# Patient Record
Sex: FEMALE | Race: WHITE | Employment: UNEMPLOYED | URBAN - METROPOLITAN AREA
[De-identification: names, ages, dates, MRNs, and addresses within clinical notes are randomized per-mention and may not be internally consistent; named-entity substitution may affect disease eponyms.]

---

## 2017-04-27 ENCOUNTER — APPOINTMENT (OUTPATIENT)
Dept: GENERAL RADIOLOGY | Age: 62
End: 2017-04-27
Attending: INTERNAL MEDICINE
Payer: MEDICAID

## 2017-04-27 ENCOUNTER — HOSPITAL ENCOUNTER (EMERGENCY)
Age: 62
Discharge: HOME | End: 2017-04-27
Attending: INTERNAL MEDICINE
Payer: MEDICAID

## 2017-04-27 VITALS
SYSTOLIC BLOOD PRESSURE: 119 MMHG | OXYGEN SATURATION: 97 % | HEIGHT: 66 IN | WEIGHT: 160 LBS | BODY MASS INDEX: 25.71 KG/M2 | DIASTOLIC BLOOD PRESSURE: 57 MMHG | RESPIRATION RATE: 16 BRPM | HEART RATE: 80 BPM | TEMPERATURE: 97.6 F

## 2017-04-27 DIAGNOSIS — R10.9 CHRONIC ABDOMINAL PAIN: ICD-10-CM

## 2017-04-27 DIAGNOSIS — J06.9 ACUTE UPPER RESPIRATORY INFECTION: ICD-10-CM

## 2017-04-27 DIAGNOSIS — R07.89 ATYPICAL CHEST PAIN: Primary | ICD-10-CM

## 2017-04-27 DIAGNOSIS — G89.29 CHRONIC ABDOMINAL PAIN: ICD-10-CM

## 2017-04-27 LAB
ALBUMIN SERPL BCP-MCNC: 3.8 G/DL (ref 3.4–5)
ALBUMIN/GLOB SERPL: 1 {RATIO} (ref 0.8–1.7)
ALP SERPL-CCNC: 73 U/L (ref 45–117)
ALT SERPL-CCNC: 19 U/L (ref 13–56)
ANION GAP BLD CALC-SCNC: 10 MMOL/L (ref 3–18)
AST SERPL W P-5'-P-CCNC: 20 U/L (ref 15–37)
BASOPHILS # BLD AUTO: 0 K/UL (ref 0–0.06)
BASOPHILS # BLD: 0 % (ref 0–2)
BILIRUB SERPL-MCNC: 0.2 MG/DL (ref 0.2–1)
BUN SERPL-MCNC: 18 MG/DL (ref 7–18)
BUN/CREAT SERPL: 16 (ref 12–20)
CALCIUM SERPL-MCNC: 9.1 MG/DL (ref 8.5–10.1)
CHLORIDE SERPL-SCNC: 101 MMOL/L (ref 100–108)
CK MB CFR SERPL CALC: NORMAL % (ref 0–4)
CK MB SERPL-MCNC: <1 NG/ML (ref 5–25)
CK SERPL-CCNC: 70 U/L (ref 26–192)
CO2 SERPL-SCNC: 25 MMOL/L (ref 21–32)
CREAT SERPL-MCNC: 1.11 MG/DL (ref 0.6–1.3)
DIFFERENTIAL METHOD BLD: ABNORMAL
EOSINOPHIL # BLD: 0.1 K/UL (ref 0–0.4)
EOSINOPHIL NFR BLD: 1 % (ref 0–5)
ERYTHROCYTE [DISTWIDTH] IN BLOOD BY AUTOMATED COUNT: 13.9 % (ref 11.6–14.5)
GLOBULIN SER CALC-MCNC: 3.7 G/DL (ref 2–4)
GLUCOSE SERPL-MCNC: 91 MG/DL (ref 74–99)
HCT VFR BLD AUTO: 35.2 % (ref 35–45)
HGB BLD-MCNC: 12.6 G/DL (ref 12–16)
LYMPHOCYTES # BLD AUTO: 27 % (ref 21–52)
LYMPHOCYTES # BLD: 3 K/UL (ref 0.9–3.6)
MCH RBC QN AUTO: 32.6 PG (ref 24–34)
MCHC RBC AUTO-ENTMCNC: 35.8 G/DL (ref 31–37)
MCV RBC AUTO: 91 FL (ref 74–97)
MONOCYTES # BLD: 0.8 K/UL (ref 0.05–1.2)
MONOCYTES NFR BLD AUTO: 8 % (ref 3–10)
NEUTS SEG # BLD: 6.9 K/UL (ref 1.8–8)
NEUTS SEG NFR BLD AUTO: 64 % (ref 40–73)
PLATELET # BLD AUTO: 307 K/UL (ref 135–420)
PMV BLD AUTO: 8.9 FL (ref 9.2–11.8)
POTASSIUM SERPL-SCNC: 4.6 MMOL/L (ref 3.5–5.5)
PROT SERPL-MCNC: 7.5 G/DL (ref 6.4–8.2)
RBC # BLD AUTO: 3.87 M/UL (ref 4.2–5.3)
SODIUM SERPL-SCNC: 136 MMOL/L (ref 136–145)
TROPONIN I SERPL-MCNC: <0.02 NG/ML (ref 0–0.06)
WBC # BLD AUTO: 10.9 K/UL (ref 4.6–13.2)

## 2017-04-27 PROCEDURE — 80053 COMPREHEN METABOLIC PANEL: CPT | Performed by: INTERNAL MEDICINE

## 2017-04-27 PROCEDURE — 93005 ELECTROCARDIOGRAM TRACING: CPT

## 2017-04-27 PROCEDURE — 85025 COMPLETE CBC W/AUTO DIFF WBC: CPT | Performed by: INTERNAL MEDICINE

## 2017-04-27 PROCEDURE — 71010 XR CHEST PORT: CPT

## 2017-04-27 PROCEDURE — 82550 ASSAY OF CK (CPK): CPT | Performed by: INTERNAL MEDICINE

## 2017-04-27 PROCEDURE — 74011250637 HC RX REV CODE- 250/637: Performed by: INTERNAL MEDICINE

## 2017-04-27 PROCEDURE — 99284 EMERGENCY DEPT VISIT MOD MDM: CPT

## 2017-04-27 RX ORDER — ACETAMINOPHEN 500 MG
1000 TABLET ORAL
Status: COMPLETED | OUTPATIENT
Start: 2017-04-27 | End: 2017-04-27

## 2017-04-27 RX ORDER — AZITHROMYCIN 250 MG/1
TABLET, FILM COATED ORAL
Qty: 15 TAB | Refills: 0 | Status: SHIPPED | OUTPATIENT
Start: 2017-04-27 | End: 2017-05-02

## 2017-04-27 RX ORDER — BACLOFEN 10 MG/1
10 TABLET ORAL
Qty: 15 TAB | Refills: 0 | Status: SHIPPED | OUTPATIENT
Start: 2017-04-27 | End: 2017-09-25

## 2017-04-27 RX ADMIN — ACETAMINOPHEN 1000 MG: 500 TABLET ORAL at 13:16

## 2017-04-27 NOTE — ED PROVIDER NOTES
[address]  EMERGENCY DEPARTMENT HISTORY AND PHYSICAL EXAM       Date: 4/27/2017   Patient Name: Narinder Ott   YOB: 1955  Medical Record Number: [id number]    History of Presenting Illness     Chief Complaint   Patient presents with    Chest Pain    Shortness of Breath    Back Pain        History Provided By:  Patient     Additional History:   11:19 AM   Narinder Ott is a 63 y.o. female presenting to the ED c/o CP and SOB x 3 days. Associated with fever (102F, yesterday), chills, neck stiffness, back pain, generalized weakness, right ear pain, cough, congestion, constipation, difficulty urinating, frequency, and decreased urine. Last BM this AM dark brown in color. Admits to sick contacts at home. PMHx include COPD, asthma, osteoporosis, and dystonia. Admits to smoking 0.5 ppd cigarettes daily. Denies vomiting, nvd, new abd pain, and any other sxs or complaints. Primary Care Provider: Chetan Stack MD   Specialist:    Past History     Past Medical History:   Past Medical History:   Diagnosis Date    Asbestosis ([address]     Asthma     Back pain     Cancer ([address]     growth in stomach    [facility]     Dystonia     MI (myocardial infarction) [facility] [facility]     Osteoporosis     Psychiatric disorder     \"nerves\"         Past Surgical History:   Past Surgical History:   Procedure Laterality Date    HX GYN      C section, hysterectomy        Social History:   Social History   Substance Use Topics    Smoking status: Current Every Day Smoker     Packs/day: 0.50    Smokeless tobacco: None    Alcohol use Yes      Comment: rare        Allergies:    Allergies   Allergen Reactions    Aspirin Other (comments)     Spasms      Ceclor [Cefaclor] Rash     Constipated headache    Ciprofloxacin Rash and Nausea Only    Cogentin [Benztropine] Other (comments)     \"Spasms\"    Ibuprofen Other (comments)     Spasms      Sulfa (Sulfonamide Antibiotics) Rash     Constipation, headache    Thorazine [Chlorpromazine] Other (comments)     \" I hear voices\"         Review of Systems   Review of Systems   Constitutional: Positive for chills and fever. HENT: Positive for congestion and ear pain. Negative for sore throat. Respiratory: Positive for cough and shortness of breath. Cardiovascular: Positive for chest pain. Gastrointestinal: Positive for constipation. Negative for abdominal distention, nausea and vomiting. Genitourinary: Positive for decreased urine volume, difficulty urinating and frequency. Negative for dysuria, flank pain, hematuria, urgency, vaginal bleeding and vaginal discharge. Musculoskeletal: Positive for back pain and neck stiffness. Negative for arthralgias and joint swelling. Skin: Negative for rash and wound. Neurological: Positive for weakness. Negative for dizziness, light-headedness and headaches. Hematological: Negative for adenopathy. All other systems reviewed and are negative. Physical Exam  Vitals:    04/27/17 1051 04/27/17 1246   BP: 143/80 119/57   Pulse: 83 80   Resp: 16 16   Temp: 97.6 °F (36.4 °C)    SpO2: 97%    Weight: 72.6 kg (160 lb)    Height: 5' 6\" (1.676 m)        Physical Exam   Constitutional: She is oriented to person, place, and time. She appears well-developed and well-nourished. HENT:   Head: Normocephalic and atraumatic. Right Ear: External ear normal. Tympanic membrane is erythematous. A middle ear effusion is present. Left Ear: External ear normal.   Nose: Mucosal edema present. Right sinus exhibits frontal sinus tenderness. Left sinus exhibits frontal sinus tenderness. Mouth/Throat: Oropharynx is clear and moist.   Nasal mild erythema. Eyes: Conjunctivae and EOM are normal. Pupils are equal, round, and reactive to light. Right eye exhibits no discharge. Left eye exhibits no discharge. No scleral icterus. Neck: Normal range of motion. Neck supple. No JVD present.  No tracheal deviation present. Cardiovascular: Normal rate, regular rhythm, normal heart sounds and intact distal pulses. Pulmonary/Chest: Effort normal and breath sounds normal. No respiratory distress. Abdominal: Soft. Bowel sounds are normal. She exhibits no distension. There is no tenderness. No HSM   Musculoskeletal: Normal range of motion. She exhibits no edema or tenderness. Neurological: She is alert and oriented to person, place, and time. She has normal reflexes. No focal motor weakness. Skin: Skin is warm and dry. No rash noted. She is not diaphoretic. Psychiatric: She has a normal mood and affect. Her behavior is normal.   Nursing note and vitals reviewed. Diagnostic Study Results     Labs -      Recent Results (from the past 12 hour(s))   EKG, 12 LEAD, INITIAL    Collection Time: 04/27/17 10:56 AM   Result Value Ref Range    Ventricular Rate 80 BPM    Atrial Rate 80 BPM    P-R Interval 154 ms    QRS Duration 84 ms    Q-T Interval 392 ms    QTC Calculation (Bezet) 452 ms    Calculated P Axis 28 degrees    Calculated R Axis -57 degrees    Calculated T Axis 42 degrees    Diagnosis       Normal sinus rhythm  Left axis deviation  Abnormal ECG  When compared with ECG of 02-NOV-2016 14:54,  RSR' pattern in V1 is no longer present  Criteria for Anteroseptal infarct are no longer present     CBC WITH AUTOMATED DIFF    Collection Time: 04/27/17 11:00 AM   Result Value Ref Range    WBC 10.9 4.6 - 13.2 K/uL    RBC 3.87 (L) 4.20 - 5.30 M/uL    HGB 12.6 12.0 - 16.0 g/dL    HCT 35.2 35.0 - 45.0 %    MCV 91.0 74.0 - 97.0 FL    MCH 32.6 24.0 - 34.0 PG    MCHC 35.8 31.0 - 37.0 g/dL    RDW 13.9 11.6 - 14.5 %    PLATELET 127 951 - 298 K/uL    MPV 8.9 (L) 9.2 - 11.8 FL    NEUTROPHILS 64 40 - 73 %    LYMPHOCYTES 27 21 - 52 %    MONOCYTES 8 3 - 10 %    EOSINOPHILS 1 0 - 5 %    BASOPHILS 0 0 - 2 %    ABS. NEUTROPHILS 6.9 1.8 - 8.0 K/UL    ABS. LYMPHOCYTES 3.0 0.9 - 3.6 K/UL    ABS.  MONOCYTES 0.8 0.05 - 1.2 K/UL    ABS. EOSINOPHILS 0.1 0.0 - 0.4 K/UL    ABS. BASOPHILS 0.0 0.0 - 0.06 K/UL    DF AUTOMATED     METABOLIC PANEL, COMPREHENSIVE    Collection Time: 04/27/17 11:00 AM   Result Value Ref Range    Sodium 136 136 - 145 mmol/L    Potassium 4.6 3.5 - 5.5 mmol/L    Chloride 101 100 - 108 mmol/L    CO2 25 21 - 32 mmol/L    Anion gap 10 3.0 - 18 mmol/L    Glucose 91 74 - 99 mg/dL    BUN 18 7.0 - 18 MG/DL    Creatinine 1.11 0.6 - 1.3 MG/DL    BUN/Creatinine ratio 16 12 - 20      GFR est AA >60 >60 ml/min/1.73m2    GFR est non-AA 50 (L) >60 ml/min/1.73m2    Calcium 9.1 8.5 - 10.1 MG/DL    Bilirubin, total 0.2 0.2 - 1.0 MG/DL    ALT (SGPT) 19 13 - 56 U/L    AST (SGOT) 20 15 - 37 U/L    Alk. phosphatase 73 45 - 117 U/L    Protein, total 7.5 6.4 - 8.2 g/dL    Albumin 3.8 3.4 - 5.0 g/dL    Globulin 3.7 2.0 - 4.0 g/dL    A-G Ratio 1.0 0.8 - 1.7     CARDIAC PANEL,(CK, CKMB & TROPONIN)    Collection Time: 04/27/17 11:00 AM   Result Value Ref Range    CK 70 26 - 192 U/L    CK - MB <1.0 <3.6 ng/ml    CK-MB Index Cannot be calulated 0.0 - 4.0 %    Troponin-I, Qt. <0.02 0.00 - 0.06 NG/ML       Radiologic Studies -    XR CHEST PORT   Final Result   IMPRESSION:     Hypoinflated lungs without acute cardiopulmonary disease. Stable exam.    As read by the radiologist.          Medical Decision Making   I am the first provider for this patient. I reviewed the vital signs, available nursing notes, past medical history, past surgical history, family history and social history. Vital Signs-Reviewed the patient's vital signs. Patient Vitals for the past 12 hrs:   Temp Pulse Resp BP SpO2   04/27/17 1246 - 80 16 119/57 -   04/27/17 1051 97.6 °F (36.4 °C) 83 16 143/80 97 %       Pulse Oximetry Analysis - Normal 97% on room air     Cardiac Monitor:   Rate: 80 bpm  Rhythm: Normal Sinus Rhythm     EKG interpretation: (Preliminary)  Rate 80 bpm NSR. Left axis deviation. No STEMI.   EKG read by Cleve Espinoza MD at 10:56 AM    Old Medical Records: Nursing notes. Provider Notes:     Ddx: ACS, CHF, MI, PE, pneumonia, pneumothorax, esophageal spasms, GERD, anemia, uremia. Rule out: other cardiovascular, pulmonary, or GI pathology. ED Course:      11:19 AM  Initial assessment performed. 12:24 PM   Pt resting comfortably, feeling better, ready to go home. Pt requests pain medication, due to allergies informed I will give Tylenol. DISCHARGE NOTE:   12:31 PM   Pt has been reexamined. Patient has no new complaints, changes, or physical findings. Care plan outlined and precautions discussed. Results were reviewed with the patient. All medications were reviewed with the patient; will d/c home. All of pt's questions and concerns were addressed. Patient was instructed and agrees to follow up with PCP, as well as to return to the ED upon further deterioration. Patient is ready to go home. Medications   acetaminophen (TYLENOL) tablet 1,000 mg (not administered)         Diagnosis   Clinical Impression:   1. Atypical chest pain    2. Acute upper respiratory infection    3. Chronic abdominal pain         Follow-up Information     Follow up With Details Comments Contact Info    Trista Rao MD Schedule an appointment as soon as possible for a visit in 1 day for PCP follow up [address]  [address] [facility]      THE [facility] EMERGENCY DEPT Go to As needed, If symptoms worsen [address] [place] [id number]  [phone]          Current Discharge Medication List      START taking these medications    Details   azithromycin (ZITHROMAX Z-MADHAVI) 250 mg tablet Take 2 today then 1 tablet daily x 4 days  Qty: 15 Tab, Refills: 0      baclofen (LIORESAL) 10 mg tablet Take 1 Tab by mouth two (2) times daily as needed.   Qty: 15 Tab, Refills: 0           _______________________________   Attestations:     SCRIBE ATTESTATION STATEMENT  Documented by: Eduar Lemons, scribing for and in the presence of Raeann Delatorre, MD.    PROVIDER ATTESTATION STATEMENT  I personally performed the services described in the documentation, reviewed the documentation, as recorded by the scribe in my presence, and it accurately and completely records my words and actions.   Eura Hatchet, MD.      _______________________________

## 2017-04-27 NOTE — ED TRIAGE NOTES
Pt ambulated into er today with complaints of chest pain and shortness of breath that started this morning. Pt also with complaints of back pain, neck pain, and leg cramps. Sepsis Screening completed    (  )Patient meets SIRS criteria. (x  )Patient does not meet SIRS criteria.       SIRS Criteria is achieved when two or more of the following are present   Temperature < 96.8°F (36°C) or > 100.9°F (38.3°C)   Heart Rate > 90 beats per minute   Respiratory Rate > 20 breaths per minute   WBC count > 12,000 or <4,000 or > 10% bands

## 2017-04-27 NOTE — DISCHARGE INSTRUCTIONS
Chest Pain: Care Instructions  Your Care Instructions  There are many things that can cause chest pain. Some are not serious and will get better on their own in a few days. But some kinds of chest pain need more testing and treatment. Your doctor may have recommended a follow-up visit in the next 8 to 12 hours. If you are not getting better, you may need more tests or treatment. Even though your doctor has released you, you still need to watch for any problems. The doctor carefully checked you, but sometimes problems can develop later. If you have new symptoms or if your symptoms do not get better, get medical care right away. If you have worse or different chest pain or pressure that lasts more than 5 minutes or you passed out (lost consciousness), call 911 or seek other emergency help right away. A medical visit is only one step in your treatment. Even if you feel better, you still need to do what your doctor recommends, such as going to all suggested follow-up appointments and taking medicines exactly as directed. This will help you recover and help prevent future problems. How can you care for yourself at home? · Rest until you feel better. · Take your medicine exactly as prescribed. Call your doctor if you think you are having a problem with your medicine. · Do not drive after taking a prescription pain medicine. When should you call for help? Call 911 if:  · You passed out (lost consciousness). · You have severe difficulty breathing. · You have symptoms of a heart attack. These may include:  ¨ Chest pain or pressure, or a strange feeling in your chest.  ¨ Sweating. ¨ Shortness of breath. ¨ Nausea or vomiting. ¨ Pain, pressure, or a strange feeling in your back, neck, jaw, or upper belly or in one or both shoulders or arms. ¨ Lightheadedness or sudden weakness. ¨ A fast or irregular heartbeat.   After you call 911, the  may tell you to chew 1 adult-strength or 2 to 4 low-dose aspirin. Wait for an ambulance. Do not try to drive yourself. Call your doctor today if:  · You have any trouble breathing. · Your chest pain gets worse. · You are dizzy or lightheaded, or you feel like you may faint. · You are not getting better as expected. · You are having new or different chest pain. Where can you learn more? Go to http://jenn-zachariah.info/. Enter A120 in the search box to learn more about \"Chest Pain: Care Instructions. \"  Current as of: May 27, 2016  Content Version: 11.2  © 1792-5718 [employer]. Care instructions adapted under license by [employer] (which disclaims liability or warranty for this information). If you have questions about a medical condition or this instruction, always ask your healthcare professional. [address] any warranty or liability for your use of this information. Upper Respiratory Infection (Cold): Care Instructions  Your Care Instructions    An upper respiratory infection, or URI, is an infection of the nose, sinuses, or throat. URIs are spread by coughs, sneezes, and direct contact. The common cold is the most frequent kind of URI. The flu and sinus infections are other kinds of URIs. Almost all URIs are caused by viruses. Antibiotics won't cure them. But you can treat most infections with home care. This may include drinking lots of fluids and taking over-the-counter pain medicine. You will probably feel better in 4 to 10 days. The doctor has checked you carefully, but problems can develop later. If you notice any problems or new symptoms, get medical treatment right away. Follow-up care is a key part of your treatment and safety. Be sure to make and go to all appointments, and call your doctor if you are having problems. It's also a good idea to know your test results and keep a list of the medicines you take. How can you care for yourself at home?   · To prevent dehydration, drink plenty of fluids, enough so that your urine is light yellow or clear like water. Choose water and other caffeine-free clear liquids until you feel better. If you have kidney, heart, or liver disease and have to limit fluids, talk with your doctor before you increase the amount of fluids you drink. · Take an over-the-counter pain medicine, such as acetaminophen (Tylenol), ibuprofen (Advil, Motrin), or naproxen (Aleve). Read and follow all instructions on the label. · Before you use cough and cold medicines, check the label. These medicines may not be safe for young children or for people with certain health problems. · Be careful when taking over-the-counter cold or flu medicines and Tylenol at the same time. Many of these medicines have acetaminophen, which is Tylenol. Read the labels to make sure that you are not taking more than the recommended dose. Too much acetaminophen (Tylenol) can be harmful. · Get plenty of rest.  · Do not smoke or allow others to smoke around you. If you need help quitting, talk to your doctor about stop-smoking programs and medicines. These can increase your chances of quitting for good. When should you call for help? Call 911 anytime you think you may need emergency care. For example, call if:  · You have severe trouble breathing. Call your doctor now or seek immediate medical care if:  · You seem to be getting much sicker. · You have new or worse trouble breathing. · You have a new or higher fever. · You have a new rash. Watch closely for changes in your health, and be sure to contact your doctor if:  · You have a new symptom, such as a sore throat, an earache, or sinus pain. · You cough more deeply or more often, especially if you notice more mucus or a change in the color of your mucus. · You do not get better as expected. Where can you learn more? Go to http://jenn-zachariah.info/.   Enter X344 in the search box to learn more about \"Upper Respiratory Infection (Cold): Care Instructions. \"  Current as of: June 30, 2016  Content Version: 11.2  © 1585-2776 [employer]. Care instructions adapted under license by [employer] (which disclaims liability or warranty for this information). If you have questions about a medical condition or this instruction, always ask your healthcare professional. [address] any warranty or liability for your use of this information. Abdominal Pain: Care Instructions  Your Care Instructions    Abdominal pain has many possible causes. Some aren't serious and get better on their own in a few days. Others need more testing and treatment. If your pain continues or gets worse, you need to be rechecked and may need more tests to find out what is wrong. You may need surgery to correct the problem. Don't ignore new symptoms, such as fever, nausea and vomiting, urination problems, pain that gets worse, and dizziness. These may be signs of a more serious problem. Your doctor may have recommended a follow-up visit in the next 8 to 12 hours. If you are not getting better, you may need more tests or treatment. The doctor has checked you carefully, but problems can develop later. If you notice any problems or new symptoms, get medical treatment right away. Follow-up care is a key part of your treatment and safety. Be sure to make and go to all appointments, and call your doctor if you are having problems. It's also a good idea to know your test results and keep a list of the medicines you take. How can you care for yourself at home? · Rest until you feel better. · To prevent dehydration, drink plenty of fluids, enough so that your urine is light yellow or clear like water. Choose water and other caffeine-free clear liquids until you feel better.  If you have kidney, heart, or liver disease and have to limit fluids, talk with your doctor before you increase the amount of fluids you drink.  · If your stomach is upset, eat mild foods, such as rice, dry toast or crackers, bananas, and applesauce. Try eating several small meals instead of two or three large ones. · Wait until 48 hours after all symptoms have gone away before you have spicy foods, alcohol, and drinks that contain caffeine. · Do not eat foods that are high in fat. · Avoid anti-inflammatory medicines such as aspirin, ibuprofen (Advil, Motrin), and naproxen (Aleve). These can cause stomach upset. Talk to your doctor if you take daily aspirin for another health problem. When should you call for help? Call 911 anytime you think you may need emergency care. For example, call if:  · You passed out (lost consciousness). · You pass maroon or very bloody stools. · You vomit blood or what looks like coffee grounds. · You have new, severe belly pain. Call your doctor now or seek immediate medical care if:  · Your pain gets worse, especially if it becomes focused in one area of your belly. · You have a new or higher fever. · Your stools are black and look like tar, or they have streaks of blood. · You have unexpected vaginal bleeding. · You have symptoms of a urinary tract infection. These may include:  ¨ Pain when you urinate. ¨ Urinating more often than usual.  ¨ Blood in your urine. · You are dizzy or lightheaded, or you feel like you may faint. Watch closely for changes in your health, and be sure to contact your doctor if:  · You are not getting better after 1 day (24 hours). Where can you learn more? Go to http://jenn-zachariah.info/. Enter P840 in the search box to learn more about \"Abdominal Pain: Care Instructions. \"  Current as of: May 27, 2016  Content Version: 11.2  © 1652-9940 [employer]. Care instructions adapted under license by [employer] (which disclaims liability or warranty for this information).  If you have questions about a medical condition or this instruction, always ask your healthcare professional. [place] [address] any warranty or liability for your use of this information.

## 2017-04-28 LAB
ATRIAL RATE: 80 BPM
CALCULATED P AXIS, ECG09: 28 DEGREES
CALCULATED R AXIS, ECG10: -57 DEGREES
CALCULATED T AXIS, ECG11: 42 DEGREES
DIAGNOSIS, 93000: NORMAL
P-R INTERVAL, ECG05: 154 MS
Q-T INTERVAL, ECG07: 392 MS
QRS DURATION, ECG06: 84 MS
QTC CALCULATION (BEZET), ECG08: 452 MS
VENTRICULAR RATE, ECG03: 80 BPM

## 2017-08-07 ENCOUNTER — HOSPITAL ENCOUNTER (EMERGENCY)
Age: 62
Discharge: HOME | End: 2017-08-07
Attending: EMERGENCY MEDICINE
Payer: MEDICAID

## 2017-08-07 VITALS
OXYGEN SATURATION: 95 % | BODY MASS INDEX: 24.11 KG/M2 | HEIGHT: 66 IN | WEIGHT: 150 LBS | SYSTOLIC BLOOD PRESSURE: 123 MMHG | DIASTOLIC BLOOD PRESSURE: 75 MMHG | TEMPERATURE: 98.8 F | RESPIRATION RATE: 16 BRPM | HEART RATE: 71 BPM

## 2017-08-07 DIAGNOSIS — M54.2 CHRONIC NECK AND BACK PAIN: Primary | ICD-10-CM

## 2017-08-07 DIAGNOSIS — M54.9 CHRONIC NECK AND BACK PAIN: Primary | ICD-10-CM

## 2017-08-07 DIAGNOSIS — G89.29 CHRONIC NECK AND BACK PAIN: Primary | ICD-10-CM

## 2017-08-07 LAB
APPEARANCE UR: CLEAR
BILIRUB UR QL: NEGATIVE
COLOR UR: YELLOW
GLUCOSE UR STRIP.AUTO-MCNC: NEGATIVE MG/DL
HGB UR QL STRIP: NEGATIVE
KETONES UR QL STRIP.AUTO: NEGATIVE MG/DL
LEUKOCYTE ESTERASE UR QL STRIP.AUTO: NEGATIVE
NITRITE UR QL STRIP.AUTO: NEGATIVE
PH UR STRIP: 5 [PH] (ref 5–8)
PROT UR STRIP-MCNC: NEGATIVE MG/DL
SP GR UR REFRACTOMETRY: 1.01 (ref 1–1.03)
UROBILINOGEN UR QL STRIP.AUTO: 0.2 EU/DL (ref 0.2–1)

## 2017-08-07 PROCEDURE — 81003 URINALYSIS AUTO W/O SCOPE: CPT | Performed by: EMERGENCY MEDICINE

## 2017-08-07 PROCEDURE — 96372 THER/PROPH/DIAG INJ SC/IM: CPT

## 2017-08-07 PROCEDURE — 99283 EMERGENCY DEPT VISIT LOW MDM: CPT

## 2017-08-07 PROCEDURE — 74011250636 HC RX REV CODE- 250/636: Performed by: EMERGENCY MEDICINE

## 2017-08-07 RX ORDER — CYCLOBENZAPRINE HCL 10 MG
10 TABLET ORAL
Qty: 20 TAB | Refills: 0 | Status: SHIPPED | OUTPATIENT
Start: 2017-08-07 | End: 2017-09-25

## 2017-08-07 RX ORDER — KETOROLAC TROMETHAMINE 10 MG/1
10 TABLET, FILM COATED ORAL EVERY 8 HOURS
Qty: 15 TAB | Refills: 0 | Status: SHIPPED | OUTPATIENT
Start: 2017-08-07 | End: 2017-08-12

## 2017-08-07 RX ORDER — KETOROLAC TROMETHAMINE 30 MG/ML
60 INJECTION, SOLUTION INTRAMUSCULAR; INTRAVENOUS
Status: COMPLETED | OUTPATIENT
Start: 2017-08-07 | End: 2017-08-07

## 2017-08-07 RX ADMIN — KETOROLAC TROMETHAMINE 60 MG: 30 INJECTION, SOLUTION INTRAMUSCULAR at 11:57

## 2017-08-07 NOTE — ED PROVIDER NOTES
HPI Comments: 11:39 AM  Dimas López is a 63 y.o. female with a PMHx of MI who presents to the ED c/o 8/10 acute on chronic neck and bilateral lower back pain onset yesterday. Pt was previously evaluated by her PCP and rxed Codeine for osteoporosis, but hasn't been taking for the past 3 months because she couldn't get an appointement. Not followed by pain management. PMHx of asbestos exposure. Pt endorses tobacco use and rare EtOH use. Patient denies fever, chills, new SOB, any other sxs or complaints. Patient is a 63 y.o. female presenting with back pain. The history is provided by the patient. No  was used. Back Pain    This is a chronic (acute on) problem. The current episode started yesterday. The problem has not changed since onset. The pain is associated with no known injury. The pain is at a severity of 8/10. Pertinent negatives include no fever. Risk factors include a history of osteoporosis. Past Medical History:   Diagnosis Date    Asbestosis ([address]     Asthma     Back pain     Cancer ([address]     growth in stomach    Chronic obstructive pulmonary disease (HCC)     Chronic pain     Dystonia     MI (myocardial infarction) ([address]     Osteoporosis     Psychiatric disorder     \"nerves\"        Past Surgical History:   Procedure Laterality Date    HX GYN      C section, hysterectomy         No family history on file. Social History     Social History    Marital status: SINGLE     Spouse name: N/A    Number of children: N/A    Years of education: N/A     Occupational History    Not on file. Social History Main Topics    Smoking status: Current Every Day Smoker     Packs/day: 0.50    Smokeless tobacco: Not on file    Alcohol use Yes      Comment: rare    Drug use: No    Sexual activity: Not on file     Other Topics Concern    Not on file     Social History Narrative         ALLERGIES: Aspirin; Ceclor [cefaclor];  Ciprofloxacin; Cogentin [benztropine]; Ibuprofen; Sulfa (sulfonamide antibiotics); and Thorazine [chlorpromazine]    Review of Systems   Constitutional: Negative for chills and fever. Respiratory: Negative for shortness of breath (new). Musculoskeletal: Positive for back pain and neck pain. All other systems reviewed and are negative. Vitals:    08/07/17 1131 08/07/17 1155   BP: (!) 138/107 123/75   Pulse: (!) 106 71   Resp: 16    Temp: 98.8 °F (37.1 °C)    SpO2: 95%    Weight: 68 kg (150 lb)    Height: 5' 6\" (1.676 m)             Physical Exam   Constitutional: She is oriented to person, place, and time. She appears ill (chronically). HENT:   Head: Normocephalic and atraumatic. Unusual facial gestures. Eyes: Pupils are equal, round, and reactive to light. Neck: Neck supple. Cardiovascular: Normal rate, regular rhythm, S1 normal, S2 normal and normal heart sounds. Pulmonary/Chest: Breath sounds normal. No respiratory distress. She has no wheezes. She has no rales. She exhibits no tenderness. Abdominal: Soft. She exhibits no distension and no mass. There is no tenderness. There is no guarding. Musculoskeletal: Normal range of motion. She exhibits tenderness. She exhibits no edema. Cervical back: She exhibits tenderness (diffuse). Lumbar back: She exhibits tenderness (midline). Neurological: She is alert and oriented to person, place, and time. No cranial nerve deficit. Skin: No rash noted. Psychiatric: She has a normal mood and affect. Her behavior is normal. Thought content normal.   Nursing note and vitals reviewed.        RESULTS:    PULSE OXIMETRY NOTE:  Pulse-ox is 95% on RA  Interpretation: Normal       No orders to display        Labs Reviewed   URINALYSIS W/ RFLX MICROSCOPIC       Recent Results (from the past 12 hour(s))   URINALYSIS W/ RFLX MICROSCOPIC    Collection Time: 08/07/17  1:00 PM   Result Value Ref Range    Color YELLOW      Appearance CLEAR      Specific gravity 1.006 1.005 - 1.030 pH (UA) 5.0 5.0 - 8.0      Protein NEGATIVE  NEG mg/dL    Glucose NEGATIVE  NEG mg/dL    Ketone NEGATIVE  NEG mg/dL    Bilirubin NEGATIVE  NEG      Blood NEGATIVE  NEG      Urobilinogen 0.2 0.2 - 1.0 EU/dL    Nitrites NEGATIVE  NEG      Leukocyte Esterase NEGATIVE  NEG         MDM  Number of Diagnoses or Management Options  Chronic neck and back pain:      Amount and/or Complexity of Data Reviewed  Clinical lab tests: ordered and reviewed      ED Course     MEDICATIONS GIVEN:  Medications   ketorolac tromethamine (TORADOL) 60 mg/2 mL injection 60 mg (60 mg IntraMUSCular Given 8/7/17 1157)         Procedures    PROGRESS NOTE:   11:39 AM  Initial assesment performed. Written by Gabrielle Mccullough ED Scribe, as dictated by Francis Coleman MD.     DISCHARGE NOTE:  1:31 PM  Corey Presume  results have been reviewed with her. She has been counseled regarding her diagnosis, treatment, and plan. She verbally conveys understanding and agreement of the signs, symptoms, diagnosis, treatment and prognosis and additionally agrees to follow up as discussed. She also agrees with the care-plan and conveys that all of her questions have been answered. I have also provided discharge instructions for her that include: educational information regarding their diagnosis and treatment, and list of reasons why they would want to return to the ED prior to their follow-up appointment, should her condition change. CLINICAL IMPRESSION:    1. Chronic neck and back pain        PLAN:  1. D/C Home  2. Discharge Medication List as of 8/7/2017  1:23 PM      START taking these medications    Details   ketorolac (TORADOL) 10 mg tablet Take 1 Tab by mouth every eight (8) hours for 5 days. , Normal, Disp-15 Tab, R-0      cyclobenzaprine (FLEXERIL) 10 mg tablet Take 1 Tab by mouth three (3) times daily as needed for Muscle Spasm(s). , Normal, Disp-20 Tab, R-0         CONTINUE these medications which have NOT CHANGED    Details   baclofen (LIORESAL) 10 mg tablet Take 1 Tab by mouth two (2) times daily as needed., Normal, Disp-15 Tab, R-0      albuterol (PROVENTIL HFA, VENTOLIN HFA, PROAIR HFA) 90 mcg/actuation inhaler Take 2 Puffs by inhalation every four (4) hours as needed for Wheezing., Print, Disp-1 Inhaler, R-0      fluPHENZAine decanoate (PROLIXIN) 25 mg/mL injection by IntraMUSCular route every fourteen (14) days. , Historical Med           3. Follow-up Information     Follow up With Details Comments Contact Info    Chhaya Quinonez MD Schedule an appointment as soon as possible for a visit in 2 days For PCP follow up [phone] [facility] [address]      [facility] EMERGENCY DEPT Go to As needed, If symptoms worsen [address] [place] [id number]  [phone]            ATTESTATION:  This note is prepared by Etta Aldrich and Kenny Gomez, acting as a Scribe for Sada Ramirez MD on 11:39 AM on 8/7/2017 . Sada Ramirez MD: The scribe's documentation has been prepared under my direction and personally reviewed by me in its entirety.

## 2017-08-07 NOTE — ED TRIAGE NOTES
C/o bilateral flank pain, low abd pain, rib pain, bilateral shoulder pain and states she feels weak all over. States she hasn't been in pain management for 3 months. Sepsis Screening completed    (  )Patient meets SIRS criteria. (x  )Patient does not meet SIRS criteria.       SIRS Criteria is achieved when two or more of the following are present   Temperature < 96.8°F (36°C) or > 100.9°F (38.3°C)   Heart Rate > 90 beats per minute   Respiratory Rate > 20 breaths per minute   WBC count > 12,000 or <4,000 or > 10% bands

## 2017-08-07 NOTE — DISCHARGE INSTRUCTIONS
Back Pain: Care Instructions  Your Care Instructions    Back pain has many possible causes. It is often related to problems with muscles and ligaments of the back. It may also be related to problems with the nerves, discs, or bones of the back. Moving, lifting, standing, sitting, or sleeping in an awkward way can strain the back. Sometimes you don't notice the injury until later. Arthritis is another common cause of back pain. Although it may hurt a lot, back pain usually improves on its own within several weeks. Most people recover in 12 weeks or less. Using good home treatment and being careful not to stress your back can help you feel better sooner. Follow-up care is a key part of your treatment and safety. Be sure to make and go to all appointments, and call your doctor if you are having problems. Its also a good idea to know your test results and keep a list of the medicines you take. How can you care for yourself at home? · Sit or lie in positions that are most comfortable and reduce your pain. Try one of these positions when you lie down:  ¨ Lie on your back with your knees bent and supported by large pillows. ¨ Lie on the floor with your legs on the seat of a sofa or chair. Aldo Ebbing on your side with your knees and hips bent and a pillow between your legs. ¨ Lie on your stomach if it does not make pain worse. · Do not sit up in bed, and avoid soft couches and twisted positions. Bed rest can help relieve pain at first, but it delays healing. Avoid bed rest after the first day of back pain. · Change positions every 30 minutes. If you must sit for long periods of time, take breaks from sitting. Get up and walk around, or lie in a comfortable position. · Try using a heating pad on a low or medium setting for 15 to 20 minutes every 2 or 3 hours. Try a warm shower in place of one session with the heating pad. · You can also try an ice pack for 10 to 15 minutes every 2 to 3 hours.  Put a thin cloth between the ice pack and your skin. · Take pain medicines exactly as directed. ¨ If the doctor gave you a prescription medicine for pain, take it as prescribed. ¨ If you are not taking a prescription pain medicine, ask your doctor if you can take an over-the-counter medicine. · Take short walks several times a day. You can start with 5 to 10 minutes, 3 or 4 times a day, and work up to longer walks. Walk on level surfaces and avoid hills and stairs until your back is better. · Return to work and other activities as soon as you can. Continued rest without activity is usually not good for your back. · To prevent future back pain, do exercises to stretch and strengthen your back and stomach. Learn how to use good posture, safe lifting techniques, and proper body mechanics. When should you call for help? Call your doctor now or seek immediate medical care if:  · You have new or worsening numbness in your legs. · You have new or worsening weakness in your legs. (This could make it hard to stand up.)  · You lose control of your bladder or bowels. Watch closely for changes in your health, and be sure to contact your doctor if:  · Your pain gets worse. · You are not getting better after 2 weeks. Where can you learn more? Go to http://jenn-zachariah.info/. Enter P163 in the search box to learn more about \"Back Pain: Care Instructions. \"  Current as of: March 21, 2017  Content Version: 11.3  © 8935-4930 [employer]. Care instructions adapted under license by [employer] (which disclaims liability or warranty for this information). If you have questions about a medical condition or this instruction, always ask your healthcare professional. [place] [address] any warranty or liability for your use of this information.        Neck Pain: Care Instructions  Your Care Instructions  You can have neck pain anywhere from the bottom of your head to the top of your shoulders. It can spread to the upper back or arms. Injuries, painting a ceiling, sleeping with your neck twisted, staying in one position for too long, and many other activities can cause neck pain. Most neck pain gets better with home care. Your doctor may recommend medicine to relieve pain or relax your muscles. He or she may suggest exercise and physical therapy to increase flexibility and relieve stress. You may need to wear a special (cervical) collar to support your neck for a day or two. Follow-up care is a key part of your treatment and safety. Be sure to make and go to all appointments, and call your doctor if you are having problems. It's also a good idea to know your test results and keep a list of the medicines you take. How can you care for yourself at home? · Try using a heating pad on a low or medium setting for 15 to 20 minutes every 2 or 3 hours. Try a warm shower in place of one session with the heating pad. · You can also try an ice pack for 10 to 15 minutes every 2 to 3 hours. Put a thin cloth between the ice and your skin. · Take pain medicines exactly as directed. ¨ If the doctor gave you a prescription medicine for pain, take it as prescribed. ¨ If you are not taking a prescription pain medicine, ask your doctor if you can take an over-the-counter medicine. · If your doctor recommends a cervical collar, wear it exactly as directed. When should you call for help? Call your doctor now or seek immediate medical care if:  · You have new or worsening numbness in your arms, buttocks or legs. · You have new or worsening weakness in your arms or legs. (This could make it hard to stand up.)  · You lose control of your bladder or bowels. Watch closely for changes in your health, and be sure to contact your doctor if:  · Your neck pain is getting worse. · You are not getting better after 1 week. · You do not get better as expected. Where can you learn more?   Go to http://jennifer.info/. Enter [id number] in the search box to learn more about \"Neck Pain: Care Instructions. \"  Current as of: March 21, 2017  Content Version: 11.3  © 2995-8729 [employer]. Care instructions adapted under license by [employer] (which disclaims liability or warranty for this information). If you have questions about a medical condition or this instruction, always ask your healthcare professional. [place] [address] any warranty or liability for your use of this information.

## 2017-09-25 ENCOUNTER — HOSPITAL ENCOUNTER (EMERGENCY)
Age: 62
Discharge: HOME | End: 2017-09-25
Attending: EMERGENCY MEDICINE
Payer: MEDICAID

## 2017-09-25 ENCOUNTER — APPOINTMENT (OUTPATIENT)
Dept: CT IMAGING | Age: 62
End: 2017-09-25
Attending: PHYSICIAN ASSISTANT
Payer: MEDICAID

## 2017-09-25 ENCOUNTER — APPOINTMENT (OUTPATIENT)
Dept: GENERAL RADIOLOGY | Age: 62
End: 2017-09-25
Attending: PHYSICIAN ASSISTANT
Payer: MEDICAID

## 2017-09-25 VITALS
OXYGEN SATURATION: 100 % | BODY MASS INDEX: 24.11 KG/M2 | WEIGHT: 150 LBS | TEMPERATURE: 97.6 F | SYSTOLIC BLOOD PRESSURE: 100 MMHG | HEIGHT: 66 IN | DIASTOLIC BLOOD PRESSURE: 51 MMHG | RESPIRATION RATE: 16 BRPM | HEART RATE: 77 BPM
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
## 2017-09-25 DIAGNOSIS — S09.90XA CHI (CLOSED HEAD INJURY), INITIAL ENCOUNTER: ICD-10-CM

## 2017-09-25 DIAGNOSIS — G89.4 CHRONIC PAIN SYNDROME: ICD-10-CM

## 2017-09-25 DIAGNOSIS — S16.1XXA CERVICAL STRAIN, INITIAL ENCOUNTER: ICD-10-CM

## 2017-09-25 PROCEDURE — 74011250636 HC RX REV CODE- 250/636: Performed by: PHYSICIAN ASSISTANT

## 2017-09-25 PROCEDURE — 70450 CT HEAD/BRAIN W/O DYE: CPT

## 2017-09-25 PROCEDURE — 99284 EMERGENCY DEPT VISIT MOD MDM: CPT

## 2017-09-25 PROCEDURE — 71010 XR CHEST SNGL V: CPT

## 2017-09-25 PROCEDURE — 72052 X-RAY EXAM NECK SPINE 6/>VWS: CPT

## 2017-09-25 PROCEDURE — 96372 THER/PROPH/DIAG INJ SC/IM: CPT

## 2017-09-25 RX ORDER — ACETAMINOPHEN AND CODEINE PHOSPHATE 300; 30 MG/1; MG/1
1 TABLET ORAL
Qty: 10 TAB | Refills: 0 | Status: SHIPPED | OUTPATIENT
Start: 2017-09-25

## 2017-09-25 RX ORDER — KETOROLAC TROMETHAMINE 30 MG/ML
60 INJECTION, SOLUTION INTRAMUSCULAR; INTRAVENOUS
Status: COMPLETED | OUTPATIENT
Start: 2017-09-25 | End: 2017-09-25

## 2017-09-25 RX ADMIN — KETOROLAC TROMETHAMINE 60 MG: 30 INJECTION, SOLUTION INTRAMUSCULAR at 14:39

## 2017-09-25 NOTE — DISCHARGE INSTRUCTIONS
Neck Strain: Care Instructions  Your Care Instructions  You have strained the muscles and ligaments in your neck. A sudden, awkward movement can strain the neck. This often occurs with falls or car accidents or during certain sports. Everyday activities like working on a computer or sleeping can also cause neck strain if they force you to hold your neck in an awkward position for a long time. It is common for neck pain to get worse for a day or two after an injury, but it should start to feel better after that. You may have more pain and stiffness for several days before it gets better. This is expected. It may take a few weeks or longer for it to heal completely. Good home treatment can help you get better faster and avoid future neck problems. Follow-up care is a key part of your treatment and safety. Be sure to make and go to all appointments, and call your doctor if you are having problems. It's also a good idea to know your test results and keep a list of the medicines you take. How can you care for yourself at home? · If you were given a neck brace (cervical collar) to limit neck motion, wear it as instructed for as many days as your doctor tells you to. Do not wear it longer than you were told to. Wearing a brace for too long can make neck stiffness worse and weaken the neck muscles. · You can try using heat or ice to see if it helps. ¨ Try using a heating pad on a low or medium setting for 15 to 20 minutes every 2 to 3 hours. Try a warm shower in place of one session with the heating pad. You can also buy single-use heat wraps that last up to 8 hours. ¨ You can also try an ice pack for 10 to 15 minutes every 2 to 3 hours. · Take pain medicines exactly as directed. ¨ If the doctor gave you a prescription medicine for pain, take it as prescribed. ¨ If you are not taking a prescription pain medicine, ask your doctor if you can take an over-the-counter medicine.   · Gently rub the area to relieve pain and help with blood flow. Do not massage the area if it hurts to do so. · Do not do anything that makes the pain worse. Take it easy for a couple of days. You can do your usual activities if they do not hurt your neck or put it at risk for more stress or injury. · Try sleeping on a special neck pillow. Place it under your neck, not under your head. Placing a tightly rolled-up towel under your neck while you sleep will also work. If you use a neck pillow or rolled towel, do not use your regular pillow at the same time. · To prevent future neck pain, do exercises to stretch and strengthen your neck and back. Learn how to use good posture, safe lifting techniques, and proper body mechanics. When should you call for help? Call 911 anytime you think you may need emergency care. For example, call if:  · You are unable to move an arm or a leg at all. Call your doctor now or seek immediate medical care if:  · You have new or worse symptoms in your arms, legs, chest, belly, or buttocks. Symptoms may include:  ¨ Numbness or tingling. ¨ Weakness. ¨ Pain. · You lose bladder or bowel control. Watch closely for changes in your health, and be sure to contact your doctor if:  · You are not getting better as expected. Where can you learn more? Go to http://jenn-zachariah.info/. Enter M253 in the search box to learn more about \"Neck Strain: Care Instructions. \"  Current as of: March 21, 2017  Content Version: 11.3  © 9664-7632 [employer]. Care instructions adapted under license by [employer] (which disclaims liability or warranty for this information). If you have questions about a medical condition or this instruction, always ask your healthcare professional. [place] [address] any warranty or liability for your use of this information. Closed Head Injury: After Your Visit  Your Care Instructions  You have had a head injury.  Often, people cannot remember what happened right before or right after a head injury. Some head injuries can make you pass out, or lose consciousness, for a few seconds or minutes right after the injury. You need to have someone watch you closely for the next 24 hours. Contact your regular doctor to discuss follow-up care. Follow-up care is a key part of your treatment and safety. Be sure to make and go to all appointments, and call your doctor if you are having problems. It's also a good idea to know your test results and keep a list of the medicines you take. How can you care for yourself at home? · Have another adult watch you closely for the next 24 hours. That person should check for signs that your head injury is getting worse. · Put ice or a cold pack on the sore area for 10 to 20 minutes at a time. Put a thin cloth between the ice and your skin. · Take an over-the-counter pain medicine, such as acetaminophen (Tylenol), ibuprofen (Advil, Motrin), or naproxen (Aleve). Read and follow all instructions on the label. · You may sleep. If your doctor tells you to, have another adult check you at the suggested times to make sure you are able to wake up, recognize the other adult, and act normally. · Take it easy for the next few days or longer if you are not feeling well. · Do not drink any alcohol for at least the next 24 hours. What is postconcussive syndrome? If you have had a mild concussion, you may have a mild headache or just feel \"not quite right. \" These symptoms are common and usually go away on their own over a few days to 4 weeks. Sometimes after a concussion you may feel as if you are not functioning as well as you did before the injury, and you may develop new symptoms. This is called postconcussive syndrome. You may:  · Have changes in your ability to solve problems, think, concentrate, or remember. · Have headaches.   · Have changes in your sleep patterns, such as not being able to sleep or sleeping all the time.  · Have changes in your personality. · Lack interest in your daily activities. · Become easily angered or anxious for no clear reason. · Have changes in your sex drive. · Lose your sense of taste or smell. · Be dizzy, lightheaded, or unsteady and find it hard to stand or walk. When should you call for help? Call 911 anytime you think you may need emergency care. For example, call if:  · You have twitching, jerking, or a seizure. · You suddenly cannot walk or stand. · You passed out (lost consciousness). · You are confused, do not know where you are, or are very sleepy or hard to wake up. Call your doctor now or seek immediate medical care if:  · You continue to vomit after 2 hours, or you have new vomiting. · You have a new watery (not like mucus from a cold) or bloody fluid coming from your nose or ears. · You have new weakness or numbness in any part of your body. · You have trouble walking. · Your headaches get worse. · Your vision changes. Watch closely for changes in your health, and be sure to contact your doctor if:  · You do not get better as expected. Where can you learn more? Go to BeachMint.be  Enter B594 in the search box to learn more about \"Closed Head Injury: After Your Visit. \"   © 2812-1352 Healthwise, Incorporated. Care instructions adapted under license by [facility] (which disclaims liability or warranty for this information). This care instruction is for use with your licensed healthcare professional. If you have questions about a medical condition or this instruction, always ask your healthcare professional. [place] [address] any warranty or liability for your use of this information.   Content Version: 9.5.632047; Last Revised: June 27, 2012

## 2017-09-25 NOTE — ED TRIAGE NOTES
C/o left shoulder and neck pain after falling last night. States she was getting out of the bed, had back pain and fell back onto floor, states she hit the back of her head, denies LOC but has a headache. States she is out of her toradol. Sepsis Screening completed    (  )Patient meets SIRS criteria. (x  )Patient does not meet SIRS criteria.       SIRS Criteria is achieved when two or more of the following are present   Temperature < 96.8°F (36°C) or > 100.9°F (38.3°C)   Heart Rate > 90 beats per minute   Respiratory Rate > 20 breaths per minute   WBC count > 12,000 or <4,000 or > 10% bands

## 2017-09-25 NOTE — ED PROVIDER NOTES
[address]  EMERGENCY DEPARTMENT HISTORY AND PHYSICAL EXAM       Date: 9/25/2017   Patient Name: Reji Vuong   YOB: 1955  Medical Record Number: [id number]    History of Presenting Illness     Chief Complaint   Patient presents with    Fall        History Provided By:  patient    Additional History: 1:09 PM  Reji Vuong is a 63 y.o. female with PMHx of osteoporosis, asthma, COPD, chronic pain, MI and dystonia presenting to the ED C/O neck pain onset yesterday s/p slip on the floor and falling onto her back. Associated sxs include left shoulder pain/upper back pain, and HA. Pt reports hitting her head on the floor. Pt is in pain management for osteoporosis with Dr. Ximena Chadwick (chronic neck & back pain). Pt has been taking Toradol for 15 days but states she ran out 10 days ago. Pt reports use of Tylenol without relief. Pt reports one week ago she has a sore throat and a fever. Pt denies LOC, use of blood thinners, and any other symptoms or complaints at this time. Primary Care Provider: Erasto Sellers MD   Specialist:    Past History     Past Medical History:   Past Medical History:   Diagnosis Date    Asbestosis ([address]     Asthma     Back pain     Cancer ([address]     growth in stomach    Chronic obstructive pulmonary disease ([address]     Chronic pain     Dystonia     Fall     MI (myocardial infarction) ([address]     Osteoporosis     Psychiatric disorder     \"nerves\"         Past Surgical History:   Past Surgical History:   Procedure Laterality Date    HX GYN      C section, hysterectomy        Family History:   No family history on file. Social History:   Social History   Substance Use Topics    Smoking status: Current Every Day Smoker     Packs/day: 0.50    Smokeless tobacco: Not on file    Alcohol use Yes      Comment: rare        Allergies:    Allergies   Allergen Reactions    Aspirin Other (comments)     Spasms      Ceclor [Cefaclor] Rash     Constipated headache    Ciprofloxacin Rash and Nausea Only    Cogentin [Benztropine] Other (comments)     \"Spasms\"    Ibuprofen Other (comments)     Spasms      Sulfa (Sulfonamide Antibiotics) Rash     Constipation, headache    Thorazine [Chlorpromazine] Other (comments)     \" I hear voices\"         Review of Systems   Review of Systems   Musculoskeletal: Positive for arthralgias (left shoulder), back pain and neck pain. Neurological: Positive for headaches. Negative for syncope. All other systems reviewed and are negative. Physical Exam  Vitals:    09/25/17 1307 09/25/17 1518   BP: 117/89 100/51   Pulse: (!) 59 77   Resp: 16 16   Temp: 97.6 °F (36.4 °C)    SpO2: 100% 100%   Weight:  68 kg (150 lb)   Height:  5' 6\" (1.676 m)       Physical Exam   Constitutional: She is oriented to person, place, and time. She appears well-developed and well-nourished. No distress. HENT:   Head: Normocephalic and atraumatic. Eyes: Conjunctivae and EOM are normal. Pupils are equal, round, and reactive to light. Neck: Normal range of motion. Neck supple. Cardiovascular: Normal rate and regular rhythm. Pulmonary/Chest: Effort normal and breath sounds normal.   Abdominal: Soft. Bowel sounds are normal. There is no tenderness. Musculoskeletal: Normal range of motion. She exhibits tenderness. She exhibits no edema or deformity. Right shoulder: Normal.        Left shoulder: She exhibits normal range of motion, no bony tenderness and no deformity. Right elbow: Normal.       Left elbow: Normal.        Right hip: Normal.        Left hip: Normal.        Right knee: Normal.        Left knee: Normal.        Right ankle: Normal. She exhibits normal pulse. Left ankle: Normal. She exhibits normal pulse. Cervical back: She exhibits tenderness. She exhibits normal range of motion and no bony tenderness. Thoracic back: She exhibits no bony tenderness. Lumbar back: She exhibits no bony tenderness. Back:    Neurological: She is alert and oriented to person, place, and time. She has normal strength. No cranial nerve deficit or sensory deficit. Gait (slow antalgic gait) abnormal. GCS eye subscore is 4. GCS verbal subscore is 5. GCS motor subscore is 6. Skin: Skin is warm and dry. Psychiatric: She has a normal mood and affect. Her behavior is normal.   Nursing note and vitals reviewed. Diagnostic Study Results     Labs -    No results found for this or any previous visit (from the past 12 hour(s)). Radiologic Studies -  The following have been ordered and reviewed:   CT HEAD WO CONT   Final Result   IMPRESSION:     1. Stable exam. No CT findings of an acute intracranial abnormality. Please note  that noncontrast head CT may be normal in early acute infarct. As read by the radiologist.     XR CHEST SNGL V    (Results Pending)   XR SPINE CERV MIN 6 VWS    (Results Pending)       RADIOLOGY FINDINGS  Chest X-ray shows no acute process. Pending review by Radiologist  Recorded by Devang Burdick ED Scribe, as dictated by Ricardo Nettles PA-C    RADIOLOGY FINDINGS  Spine/Cerv X-ray shows no acute process. Pending review by Radiologist  Recorded by Devang Burdick ED Scribe, as dictated by Ricardo Nettles PA-C    Medical Decision Making   I am the first provider for this patient. I reviewed the vital signs, available nursing notes, past medical history, past surgical history, family history and social history. Vital Signs-Reviewed the patient's vital signs. Patient Vitals for the past 12 hrs:   Temp Pulse Resp BP SpO2   09/25/17 1518 - 77 16 100/51 100 %   09/25/17 1307 97.6 °F (36.4 °C) (!) 59 16 117/89 100 %       Pulse Oximetry Analysis - Normal 100% on RA. Old Medical Records: Nursing notes. Procedures:   Procedures    ED Course:  1:09 AM  Initial assessment performed.  The patients presenting problems have been discussed, and they are in agreement with the care plan formulated and outlined with them. I have encouraged them to ask questions as they arise throughout their visit. Medications Given in the ED:  Medications   ketorolac tromethamine (TORADOL) 60 mg/2 mL injection 60 mg (60 mg IntraMUSCular Given 9/25/17 7220)       Discharge Note:  3:25 PM   Pt has been reexamined. Patient has no new complaints, changes, or physical findings. Care plan outlined and precautions discussed. Results were reviewed with the patient. All medications were reviewed with the patient; will d/c home with Tylenol. All of pt's questions and concerns were addressed. Patient was instructed and agrees to follow up with pcp & pain management, as well as to return to the ED upon further deterioration. Patient is ready to go home. Diagnosis   Clinical Impression:   1. Fall from slip, trip, or stumble, initial encounter    2. CHI (closed head injury), initial encounter    3. Cervical strain, initial encounter    4. Chronic pain syndrome         Follow-up Information     Follow up With Details Comments Contact Info    Burnell Cowden, MD Schedule an appointment as soon as possible for a visit in 2 days For primary care follow up. [address]  [address] [facility], [address]      THE [facility] EMERGENCY DEPT Go to As needed, If symptoms worsen [address] [place]  [phone]          Current Discharge Medication List      START taking these medications    Details   acetaminophen-codeine (TYLENOL-CODEINE #3) 300-30 mg per tablet Take 1 Tab by mouth every four (4) hours as needed for Pain. Max Daily Amount: 6 Tabs. Qty: 10 Tab, Refills: 0             _______________________________   Attestations: This note is prepared by Malachy Baumgarten, acting as a Scribe for Ricardo Nettles PA-C on 1:09 PM on 9/25/2017 . Ricardo Nettles PA-C: The scribe's documentation has been prepared under my direction and personally reviewed by me in its entirety.   _______________________________

## 2017-09-25 NOTE — ED NOTES
Pt sleeping, awakened to voice; Pt calling for ride home;    I have reviewed discharge instructions with the patient. The patient verbalized understanding.

## 2017-11-06 ENCOUNTER — HOSPITAL ENCOUNTER (EMERGENCY)
Age: 62
Discharge: HOME | End: 2017-11-06
Attending: EMERGENCY MEDICINE
Payer: MEDICAID

## 2017-11-06 VITALS
RESPIRATION RATE: 18 BRPM | BODY MASS INDEX: 24.11 KG/M2 | HEART RATE: 93 BPM | WEIGHT: 150 LBS | DIASTOLIC BLOOD PRESSURE: 40 MMHG | HEIGHT: 66 IN | OXYGEN SATURATION: 98 % | SYSTOLIC BLOOD PRESSURE: 104 MMHG | TEMPERATURE: 99.5 F

## 2017-11-06 DIAGNOSIS — G89.4 CHRONIC PAIN SYNDROME: Primary | ICD-10-CM

## 2017-11-06 LAB
ATRIAL RATE: 99 BPM
CALCULATED P AXIS, ECG09: 49 DEGREES
CALCULATED R AXIS, ECG10: -88 DEGREES
CALCULATED T AXIS, ECG11: 51 DEGREES
DIAGNOSIS, 93000: NORMAL
P-R INTERVAL, ECG05: 140 MS
Q-T INTERVAL, ECG07: 336 MS
QRS DURATION, ECG06: 72 MS
QTC CALCULATION (BEZET), ECG08: 431 MS
VENTRICULAR RATE, ECG03: 99 BPM

## 2017-11-06 PROCEDURE — 74011250637 HC RX REV CODE- 250/637: Performed by: EMERGENCY MEDICINE

## 2017-11-06 PROCEDURE — 93005 ELECTROCARDIOGRAM TRACING: CPT

## 2017-11-06 PROCEDURE — 99284 EMERGENCY DEPT VISIT MOD MDM: CPT

## 2017-11-06 RX ORDER — ACETAMINOPHEN 500 MG
1000 TABLET ORAL
Status: COMPLETED | OUTPATIENT
Start: 2017-11-06 | End: 2017-11-06

## 2017-11-06 RX ADMIN — ACETAMINOPHEN 1000 MG: 500 TABLET ORAL at 06:37

## 2017-11-06 NOTE — ED NOTES
Pt feeling much better. Pt able to ambulate. Breathing with ease. No signs of distress. Discharged home.

## 2017-11-06 NOTE — ED PROVIDER NOTES
[address]  EMERGENCY DEPARTMENT HISTORY AND PHYSICAL EXAM       Date: 11/6/2017   Patient Name: Cesilia Torres   YOB: 1955  Medical Record Number: [id number]    History of Presenting Illness     Chief Complaint   Patient presents with    Shortness of Breath        History Provided By:  patient    Additional History:   5:40 AM    Cesilia Torres is a 63 y.o. female with PMHx of asthma, CA, COPD, chronic pain, and MI presenting ambulatory to the ED c/o SOB, onset PTA. Worsened when ambulating. Pt took Extra Strength Tylenol to no relief. Notes no other associated symptoms. Pt also c/o right shoulder pain and back pain. Pt specifically denies any other sxs or complaints at this time. Primary Care Provider: Sherrie Jiménez MD   Specialist:    Past History     Past Medical History:   Past Medical History:   Diagnosis Date    Asbestosis([address])     Asthma     Back pain     Cancer ([facility]     growth in stomach    Chronic obstructive pulmonary disease ([facility]     Chronic pain     Dystonia     Fall     MI (myocardial infarction)     Osteoporosis     Psychiatric disorder     \"nerves\"         Past Surgical History:   Past Surgical History:   Procedure Laterality Date    HX GYN      C section, hysterectomy        Family History:   History reviewed. No pertinent family history. Social History:   Social History   Substance Use Topics    Smoking status: Current Every Day Smoker     Packs/day: 0.25    Smokeless tobacco: Never Used    Alcohol use Yes      Comment: rare        Allergies:    Allergies   Allergen Reactions    Aspirin Other (comments)     Spasms      Ceclor [Cefaclor] Rash     Constipated headache    Ciprofloxacin Rash and Nausea Only    Cogentin [Benztropine] Other (comments)     \"Spasms\"    Ibuprofen Other (comments)     Spasms      Sulfa (Sulfonamide Antibiotics) Rash     Constipation, headache    Thorazine [Chlorpromazine] Other (comments)     \" I hear voices\"         Review of Systems   Review of Systems   Respiratory: Positive for shortness of breath. Musculoskeletal: Positive for arthralgias (left shoulder) and back pain. All other systems reviewed and are negative. Physical Exam  Vitals:    11/06/17 0536 11/06/17 0550 11/06/17 0600   BP:  103/51 104/40   Pulse: 98 91 93   Resp: 22 20 18   Temp: 99.5 °F (37.5 °C)     SpO2: 100% 97% 98%   Weight: 68 kg (150 lb)     Height: 5' 6\" (1.676 m)         Physical Exam   Constitutional: She is oriented to person, place, and time. She appears well-developed and well-nourished. She appears ill. No distress. Chronically ill looking female with chronic dystonic movements of the mouth. HENT:   Head: Normocephalic and atraumatic. Eyes: Pupils are equal, round, and reactive to light. Neck: Neck supple. Cardiovascular: Normal rate, regular rhythm, S1 normal, S2 normal and normal heart sounds. Pulmonary/Chest: Breath sounds normal. No respiratory distress. She has no wheezes. She has no rales. She exhibits no tenderness. Abdominal: Soft. She exhibits no distension and no mass. There is no tenderness. There is no guarding. Musculoskeletal: Normal range of motion. She exhibits no edema. Left shoulder: She exhibits tenderness (minimal). Left upper extremity neurovascularly intact. Neurological: She is alert and oriented to person, place, and time. No cranial nerve deficit. Cranial nerves 2-12 intact. Skin: No rash noted. Psychiatric: She has a normal mood and affect. Her behavior is normal. Thought content normal.   Nursing note and vitals reviewed.        Diagnostic Study Results     Labs -      Recent Results (from the past 12 hour(s))   EKG, 12 LEAD, INITIAL    Collection Time: 11/06/17  5:45 AM   Result Value Ref Range    Ventricular Rate 99 BPM    Atrial Rate 99 BPM    P-R Interval 140 ms    QRS Duration 72 ms    Q-T Interval 336 ms    QTC Calculation (Bezet) 431 ms    Calculated P Axis 49 degrees    Calculated R Axis -88 degrees    Calculated T Axis 51 degrees    Diagnosis       Sinus rhythm with fusion complexes  Left axis deviation  Abnormal ECG  When compared with ECG of 27-APR-2017 10:56,  fusion complexes are now present         Radiologic Studies -   No orders to display        Medical Decision Making   I am the first provider for this patient. I reviewed the vital signs, available nursing notes, past medical history, past surgical history, family history and social history. Vital Signs-Reviewed the patient's vital signs. Patient Vitals for the past 12 hrs:   Temp Pulse Resp BP SpO2   11/06/17 0600 - 93 18 104/40 98 %   11/06/17 0550 - 91 20 103/51 97 %   11/06/17 0536 99.5 °F (37.5 °C) 98 22 - 100 %       Pulse Oximetry Analysis - Normal 97% on RA. No intervention needed. EKG interpretation: (Preliminary)  5:45 AM   99 bpm; Sinus rhythm with fusion complexes; Left axis deviation  EKG read by Ryan Paez MD at 5:45 AM    Old Medical Records: Nursing notes. ED Course:     5:40 AM   Initial assessment performed. The patients presenting problems have been discussed, and they are in agreement with the care plan formulated and outlined with them. I have encouraged them to ask questions as they arise throughout their visit. Medications Given in the ED:  Medications   acetaminophen (TYLENOL) tablet 1,000 mg (1,000 mg Oral Given 11/6/17 9192)        Discharge Note:  6:29 AM  Patients results have been reviewed with them. Patient and/or family have verbally conveyed their understanding and agreement of the patient's signs, symptoms, diagnosis, treatment and prognosis and additionally agree to follow up as recommended or return to the Emergency Room should their condition change prior to their follow-up appointment. Patient verbally agrees with the care-plan and verbally conveys that all of their questions have been answered.  Discharge instructions have also been provided to the patient with some educational information regarding their diagnosis as well a list of reasons why they would want to return to the ER prior to their follow-up appointment should their condition change. Diagnosis   Clinical Impression:   1. Chronic pain syndrome         Follow-up Information     Follow up With Details Comments Contact Info    Maryana Bruno MD Schedule an appointment as soon as possible for a visit in 2 days for PCP follow up [address]  [address] [facility], [address]      [facility] EMERGENCY DEPT  As needed, If symptoms worsen [address] [place] [address]  [phone]          Discharge Medication List as of 11/6/2017  6:29 AM          _______________________________   Attestations:     SCRIBE ATTESTATION:  This note is prepared by Any Tiwari, acting as Scribe for Jennifer Hurley MD.    PROVIDER ATTESTATION:  Jennifer Hurley MD: The scribe's documentation has been prepared under my direction and personally reviewed by me in its entirety.  I confirm that the note above accurately reflects all work, treatment, procedures, and medical decision making performed by me.   _______________________________

## 2017-11-06 NOTE — ED NOTES
Pt stable. Pt alert and oriented x3. Pt complaining of left arm pain and difficulty breathing. VS stable on cardiac monitor. Oxygen sat 100%. Pt able to move extremity. Pt states she ran out of her meds.

## 2017-11-06 NOTE — ED NOTES
Pt stable. Pt alert and awake. No signs of distress. No complaints. I have reviewed discharge instructions with the patient. The patient verbalized understanding.  Patient armband removed and shredded

## 2017-11-06 NOTE — DISCHARGE INSTRUCTIONS
Chronic Pain: Care Instructions  Your Care Instructions    Chronic pain is pain that lasts a long time (months or even years) and may or may not have a clear cause. It is different from acute pain, which usually does have a clear cause-like an injury or illness-and gets better over time. Chronic pain:  · Lasts over time but may vary from day to day. · Does not go away despite efforts to end it. · May disrupt your sleep and lead to fatigue. · May cause depression or anxiety. · May make your muscles tense, causing more pain. · Can disrupt your work, hobbies, home life, and relationships with friends and family. Chronic pain is a very real condition. It is not just in your head. Treatment can help and usually includes several methods used together, such as medicines, physical therapy, exercise, and other treatments. Learning how to relax and changing negative thought patterns can also help you cope. Chronic pain is complex. Taking an active role in your treatment will help you better manage your pain. Tell your doctor if you have trouble dealing with your pain. You may have to try several things before you find what works best for you. Follow-up care is a key part of your treatment and safety. Be sure to make and go to all appointments, and call your doctor if you are having problems. It's also a good idea to know your test results and keep a list of the medicines you take. How can you care for yourself at home? · Pace yourself. Break up large jobs into smaller tasks. Save harder tasks for days when you have less pain, or go back and forth between hard tasks and easier ones. Take rest breaks. · Relax, and reduce stress. Relaxation techniques such as deep breathing or meditation can help. · Keep moving. Gentle, daily exercise can help reduce pain over the long run. Try low- or no-impact exercises such as walking, swimming, and stationary biking. Do stretches to stay flexible.   · Try heat, cold packs, and massage. · Get enough sleep. Chronic pain can make you tired and drain your energy. Talk with your doctor if you have trouble sleeping because of pain. · Think positive. Your thoughts can affect your pain level. Do things that you enjoy to distract yourself when you have pain instead of focusing on the pain. See a movie, read a book, listen to music, or spend time with a friend. · If you think you are depressed, talk to your doctor about treatment. · Keep a daily pain diary. Record how your moods, thoughts, sleep patterns, activities, and medicine affect your pain. You may find that your pain is worse during or after certain activities or when you are feeling a certain emotion. Having a record of your pain can help you and your doctor find the best ways to treat your pain. · Take pain medicines exactly as directed. ¨ If the doctor gave you a prescription medicine for pain, take it as prescribed. ¨ If you are not taking a prescription pain medicine, ask your doctor if you can take an over-the-counter medicine. Reducing constipation caused by pain medicine  · Include fruits, vegetables, beans, and whole grains in your diet each day. These foods are high in fiber. · Drink plenty of fluids, enough so that your urine is light yellow or clear like water. If you have kidney, heart, or liver disease and have to limit fluids, talk with your doctor before you increase the amount of fluids you drink. · If your doctor recommends it, get more exercise. Walking is a good choice. Bit by bit, increase the amount you walk every day. Try for at least 30 minutes on most days of the week. · Schedule time each day for a bowel movement. A daily routine may help. Take your time and do not strain when having a bowel movement. When should you call for help? Call your doctor now or seek immediate medical care if:  ? · Your pain gets worse or is out of control.    ? · You feel down or blue, or you do not enjoy things like you once did. You may be depressed, which is common in people with chronic pain. Depression can be treated. ? · You have vomiting or cramps for more than 2 hours. ? Watch closely for changes in your health, and be sure to contact your doctor if:  ? · You cannot sleep because of pain. ? · You are very worried or anxious about your pain. ? · You have trouble taking your pain medicine. ? · You have any concerns about your pain medicine. ? · You have trouble with bowel movements, such as:  ¨ No bowel movement in 3 days. ¨ Blood in the anal area, in your stool, or on the toilet paper. ¨ Diarrhea for more than 24 hours. Where can you learn more? Go to http://ejnn-zachariah.info/. Enter N004 in the search box to learn more about \"Chronic Pain: Care Instructions. \"  Current as of: October 14, 2016  Content Version: 11.4  © 4189-5029 [employer]. Care instructions adapted under license by [employer] (which disclaims liability or warranty for this information). If you have questions about a medical condition or this instruction, always ask your healthcare professional. [place] [address] any warranty or liability for your use of this information.

## 2017-11-06 NOTE — ED TRIAGE NOTES
Pt states \" I am having trouble breathing. I feel like I have the flu and I am having pain all over and especially in my left shoulder. \"

## 2021-05-17 ENCOUNTER — HOSPITAL ENCOUNTER (EMERGENCY)
Age: 66
Discharge: HOME | End: 2021-05-17
Attending: EMERGENCY MEDICINE
Payer: MEDICAID

## 2021-05-17 ENCOUNTER — APPOINTMENT (OUTPATIENT)
Dept: GENERAL RADIOLOGY | Age: 66
End: 2021-05-17
Attending: EMERGENCY MEDICINE
Payer: MEDICAID

## 2021-05-17 VITALS
BODY MASS INDEX: 23.32 KG/M2 | HEIGHT: 65 IN | DIASTOLIC BLOOD PRESSURE: 76 MMHG | OXYGEN SATURATION: 94 % | SYSTOLIC BLOOD PRESSURE: 117 MMHG | RESPIRATION RATE: 21 BRPM | TEMPERATURE: 98 F | HEART RATE: 92 BPM | WEIGHT: 140 LBS

## 2021-05-17 DIAGNOSIS — R06.02 SOB (SHORTNESS OF BREATH): Primary | ICD-10-CM

## 2021-05-17 DIAGNOSIS — S16.1XXD NECK STRAIN, SUBSEQUENT ENCOUNTER: ICD-10-CM

## 2021-05-17 DIAGNOSIS — J44.9 COPD MIXED TYPE (HCC): ICD-10-CM

## 2021-05-17 PROCEDURE — 94640 AIRWAY INHALATION TREATMENT: CPT

## 2021-05-17 PROCEDURE — 71045 X-RAY EXAM CHEST 1 VIEW: CPT

## 2021-05-17 PROCEDURE — 99285 EMERGENCY DEPT VISIT HI MDM: CPT

## 2021-05-17 PROCEDURE — 93005 ELECTROCARDIOGRAM TRACING: CPT

## 2021-05-17 PROCEDURE — 74011000250 HC RX REV CODE- 250: Performed by: EMERGENCY MEDICINE

## 2021-05-17 PROCEDURE — 74011250637 HC RX REV CODE- 250/637: Performed by: EMERGENCY MEDICINE

## 2021-05-17 RX ORDER — ALBUTEROL SULFATE 90 UG/1
1 AEROSOL, METERED RESPIRATORY (INHALATION)
Qty: 1 INHALER | Refills: 0 | Status: SHIPPED | OUTPATIENT
Start: 2021-05-17 | End: 2021-05-17 | Stop reason: SDUPTHER

## 2021-05-17 RX ORDER — IPRATROPIUM BROMIDE AND ALBUTEROL SULFATE 2.5; .5 MG/3ML; MG/3ML
3 SOLUTION RESPIRATORY (INHALATION)
Status: COMPLETED | OUTPATIENT
Start: 2021-05-17 | End: 2021-05-17

## 2021-05-17 RX ORDER — PREDNISONE 20 MG/1
20 TABLET ORAL 2 TIMES DAILY
Qty: 10 TAB | Refills: 0 | Status: SHIPPED | OUTPATIENT
Start: 2021-05-17 | End: 2021-05-22

## 2021-05-17 RX ORDER — PREDNISONE 20 MG/1
20 TABLET ORAL 2 TIMES DAILY
Qty: 10 TAB | Refills: 0 | Status: SHIPPED | OUTPATIENT
Start: 2021-05-17 | End: 2021-05-17 | Stop reason: SDUPTHER

## 2021-05-17 RX ORDER — DEXAMETHASONE SODIUM PHOSPHATE 4 MG/ML
10 INJECTION, SOLUTION INTRA-ARTICULAR; INTRALESIONAL; INTRAMUSCULAR; INTRAVENOUS; SOFT TISSUE ONCE
Status: COMPLETED | OUTPATIENT
Start: 2021-05-17 | End: 2021-05-17

## 2021-05-17 RX ORDER — ALBUTEROL SULFATE 90 UG/1
1 AEROSOL, METERED RESPIRATORY (INHALATION)
Qty: 1 INHALER | Refills: 0 | Status: SHIPPED | OUTPATIENT
Start: 2021-05-17

## 2021-05-17 RX ORDER — DICLOFENAC SODIUM 10 MG/G
2 GEL TOPICAL 4 TIMES DAILY
Qty: 100 G | Refills: 0 | Status: SHIPPED | OUTPATIENT
Start: 2021-05-17

## 2021-05-17 RX ADMIN — DEXAMETHASONE SODIUM PHOSPHATE 10 MG: 4 INJECTION, SOLUTION INTRAMUSCULAR; INTRAVENOUS at 06:51

## 2021-05-17 RX ADMIN — IPRATROPIUM BROMIDE AND ALBUTEROL SULFATE 3 ML: .5; 3 SOLUTION RESPIRATORY (INHALATION) at 06:51

## 2021-05-17 NOTE — PROGRESS NOTES
5/17/21 @ 8335: CM contacted the patient via telephone and confirmed that the patient feels as though she needs home health physical therapy. The patient states that she needs to get off the phone at this time because she is trying to get her air conditioner working. The patient states that she will call this CM back when she is ready for assistance with home health.

## 2021-05-17 NOTE — ED PROVIDER NOTES
EMERGENCY DEPARTMENT HISTORY AND PHYSICAL EXAM    Date: 5/17/2021  Patient Name: Delgado Iglesias    History of Presenting Illness     Chief Complaint   Patient presents with    Shortness of Breath         History Provided By: Patient and EMS    Delgado Iglesias is a 63 y.o. female who presents to the emergency department C/O shortness of breath and neck pain. EMS reports that the patient's vital signs were normal.  Patient states her shortness of breath started this morning around 4 AM.  States the neck pain has been on and off as well. States she does have history of asthma/COPD and has been seen at [facility] several times for this problem. She states she does have an inhaler at home and took it this morning. She denies any chest pain or fevers. No other complaints        PCP: Yoly Kong MD    Current Outpatient Medications   Medication Sig Dispense Refill    diclofenac (VOLTAREN) 1 % gel Apply 2 g to affected area four (4) times daily. 100 g 0    albuterol (PROVENTIL HFA, VENTOLIN HFA, PROAIR HFA) 90 mcg/actuation inhaler Take 1 Puff by inhalation every four (4) hours as needed for Wheezing. 1 Inhaler 0    predniSONE (DELTASONE) 20 mg tablet Take 20 mg by mouth two (2) times a day for 5 days. 10 Tab 0    acetaminophen-codeine (TYLENOL-CODEINE #3) 300-30 mg per tablet Take 1 Tab by mouth every four (4) hours as needed for Pain. Max Daily Amount: 6 Tabs. 10 Tab 0    albuterol (PROVENTIL HFA, VENTOLIN HFA, PROAIR HFA) 90 mcg/actuation inhaler Take 2 Puffs by inhalation every four (4) hours as needed for Wheezing. 1 Inhaler 0    fluPHENZAine decanoate (PROLIXIN) 25 mg/mL injection by IntraMUSCular route.  Indications: every month         Past History     Past Medical History:  Past Medical History:   Diagnosis Date    Asbestosis(501)     Asthma     Back pain     Cancer ([address]     growth in stomach    Chronic obstructive pulmonary disease (HCC)     Chronic pain     Dystonia     Fall     [facility]     Osteoporosis     Psychiatric disorder     \"nerves\"        Past Surgical History:  Past Surgical History:   Procedure Laterality Date    HX GYN      C section, hysterectomy       Family History:  History reviewed. No pertinent family history. Social History:  Social History     Tobacco Use    Smoking status: Current Every Day Smoker     Packs/day: 0.25    Smokeless tobacco: Never Used   Substance Use Topics    Alcohol use: Yes     Comment: rare    Drug use: No       Allergies: Allergies   Allergen Reactions    Aspirin Other (comments)     Spasms      Ceclor [Cefaclor] Rash     Constipated headache    Ciprofloxacin Rash and Nausea Only    Cogentin [Benztropine] Other (comments)     \"Spasms\"    Ibuprofen Other (comments)     Spasms      Sulfa (Sulfonamide Antibiotics) Rash     Constipation, headache    Thorazine [Chlorpromazine] Other (comments)     \" I hear voices\"          Review of Systems   Review of Systems   Constitutional: Negative for fever. Respiratory: Positive for shortness of breath. Negative for cough, chest tightness and wheezing. Cardiovascular: Negative for chest pain. Gastrointestinal: Negative for abdominal pain and vomiting. Musculoskeletal: Positive for neck pain. Neurological: Negative for dizziness, weakness and headaches. All other systems reviewed and are negative. All other systems reviewed and are negative.     Physical Exam     Vitals:    05/17/21 0628 05/17/21 0645   BP: 135/66 125/76   Pulse: 100 95   Resp: 18 17   Temp: 98 °F (36.7 °C)    SpO2: 96% 99%   Weight: 63.5 kg (140 lb)    Height: 5' 5\" (1.651 m)      Physical Exam    Nursing notes and vital signs reviewed    Constitutional: Chronically ill-appearing, unkempt non toxic appearing, no acute distress, appearing stated age  Eyes: PERRL, EOMI, No conjunctival injection  ENT: external ears normal, No rhinorrhea, external nose normal, mucous membranes moist  Cardiovascular: Regular rate and rhythm, no murmurs, No JVD  Respiratory: Clear to ausculation bilaterally, No stridor, Normal work of breathing and chest excursion bilaterally  Abdomen: Soft, non tender, non distended, normoactive bowel sounds, No rigidity, no peritoneal signs  Musculoskeletal:  No evidence of obvious injury to Head, Neck, Back, Extremities, no LE edema  Skin: Warm, dry, No obvious rashes  Neuro: Alert and oriented x 3, CN 2-12 intact, normal speech, strength and sensation full and symmetric bilaterally  Psychiatric: Flat affect      Diagnostic Study Results     Labs -     Recent Results (from the past 72 hour(s))   EKG, 12 LEAD, INITIAL    Collection Time: 05/17/21  6:28 AM   Result Value Ref Range    Ventricular Rate 100 BPM    Atrial Rate 100 BPM    P-R Interval 138 ms    QRS Duration 82 ms    Q-T Interval 352 ms    QTC Calculation (Bezet) 454 ms    Calculated P Axis 64 degrees    Calculated R Axis -38 degrees    Calculated T Axis 55 degrees    Diagnosis       Sinus rhythm with premature atrial complexes  Left axis deviation  Abnormal ECG  When compared with ECG of 06-NOV-2017 05:45,  fusion complexes are no longer present  premature atrial complexes are now present         Radiologic Studies -   XR CHEST PORT    (Results Pending)     CT Results  (Last 48 hours)    None        CXR Results  (Last 48 hours)    None          Medications given in the ED-  Medications   dexamethasone (DECADRON) 4 mg/mL Oral 10 mg (10 mg Oral Given 5/17/21 0651)   albuterol-ipratropium (DUO-NEB) 2.5 MG-0.5 MG/3 ML (3 mL Nebulization Given 5/17/21 8308)         Medical Decision Making     I reviewed the vital signs, available nursing notes, past medical history, past surgical history, family history and social history. Vital Signs interpretation- I have reviewed the patient's vital signs.     Pulse Oximetry interpretation - 98% on Room air     Cardiac Monitor interpretation:  Rate: 99 bpm  Rhythm: sinus    EKG interpretation: (Preliminary)  EKG interpretation by Dr. Tacey Runner 100 sinus rhythm with premature atrial complexes, left axis deviation, nonspecific ST changes. No changes from prior    Records Reviewed: Nursing Notes, Old Medical Records, Previous electrocardiograms, Previous Radiology Studies and Previous Laboratory Studies    Procedures:  Procedures    ED Course and MDM:  After reviewing the patient's chart on care everywhere the patient does frequent the emergency department very often for this similar complaint, sob and neck pain. She is mainly seen at [facility] seen in their emergency department 4 times this month on May 1, third, 12th and 17th. She was also seen 3 times in April this year on April 3, 20th and 29th. She was found to be positive for influenza A on 4/20/2021. She does have noted's history of COPD. The most recent round of blood work was done on May 12 and was all normal.  She has had multiple rounds of troponins that are negative. Chest x-ray was normal at that time. It was noted during her evaluations earlier this month regarding her neck pain that one of her cats jumped on her back and attacked her hair a couple of nights ago. At this time patient appears to be potentially having a mild exacerbation of her COPD/asthma. Will give DuoNeb breathing treatment and Decadron obtain chest x-ray and EKG. Patient given neck brace collar. Diagnosis and Disposition       DISCHARGE NOTE:    Phill Kendrickcrow  results have been reviewed with her. She has been counseled regarding her diagnosis, treatment, and plan. She verbally conveys understanding and agreement of the signs, symptoms, diagnosis, treatment and prognosis and additionally agrees to follow up as discussed. She also agrees with the care-plan and conveys that all of her questions have been answered.   I have also provided discharge instructions for her that include: educational information regarding their diagnosis and treatment, and list of reasons why they would want to return to the ED prior to their follow-up appointment, should her condition change. She has been provided with education for proper emergency department utilization. CLINICAL IMPRESSION:    1. SOB (shortness of breath)    2. COPD mixed type ([address]    3. Neck strain, subsequent encounter        PLAN:  1. D/C Home  2. Current Discharge Medication List      START taking these medications    Details   diclofenac (VOLTAREN) 1 % gel Apply 2 g to affected area four (4) times daily. Qty: 100 g, Refills: 0  Start date: 5/17/2021      !! albuterol (PROVENTIL HFA, VENTOLIN HFA, PROAIR HFA) 90 mcg/actuation inhaler Take 1 Puff by inhalation every four (4) hours as needed for Wheezing. Qty: 1 Inhaler, Refills: 0  Start date: 5/17/2021      predniSONE (DELTASONE) 20 mg tablet Take 20 mg by mouth two (2) times a day for 5 days. Qty: 10 Tab, Refills: 0  Start date: 5/17/2021, End date: 5/22/2021       !! - Potential duplicate medications found. Please discuss with provider. CONTINUE these medications which have NOT CHANGED    Details   !! albuterol (PROVENTIL HFA, VENTOLIN HFA, PROAIR HFA) 90 mcg/actuation inhaler Take 2 Puffs by inhalation every four (4) hours as needed for Wheezing. Qty: 1 Inhaler, Refills: 0       !! - Potential duplicate medications found. Please discuss with provider. 3.   Follow-up Information     Follow up With Specialties Details Why Contact Info    Racquel Mulligan MD Internal Medicine   [address] [place] [address]  [id number]  [phone]          _______________________________      Please note that this dictation was completed with mygall, the [employer] voice recognition software. Quite often unanticipated grammatical, syntax, homophones, and other interpretive errors are inadvertently transcribed by the computer software. Please disregard these errors.   Please excuse any errors that have escaped final proofreading.

## 2021-05-17 NOTE — ED NOTES
Rounded on pt. Given warm blankets. Updated that transport has been set up. We do not have a time yet for transport. I have reviewed discharge instructions with the patient. The patient verbalized understanding.

## 2021-05-17 NOTE — DISCHARGE INSTRUCTIONS
Take prednisone steroid as directed to help prevent your COPD/asthma from flaring up  Use albuterol as needed  Apply diclofenac gel to neck

## 2021-05-17 NOTE — ED NOTES
Telephone call with Roque Antoine in  she will relay message to Stella Gomez cm about this patient will need follow up for home care needs.    Jc Watkins from [employer] called and she is aware of need for follow up for possible home health care needs

## 2021-05-17 NOTE — ED NOTES
Pt finished breathing treatment. sats 95% on RA resp 18 even and unlabored. Pt states she is feeling better. Murphy not have anyone that is able to pick her up. Pt refused to have soft collar placed \" that wont be comfortable. Given juice per pt request.  Will work to arrange transport home for pt she is aware that she will need to pay transport bill.

## 2021-05-17 NOTE — ED TRIAGE NOTES
Patient arrives from home via EMS c/o shortness of breath and stiff neck since last night. Patient states she has not had her prolixin shot (used to treat schitzophrenia). Patient speaking in clear and complete sentences. Easy WOB. Ambulatory with steady gait. Denies chest pain. Patient reported by EMS to have bedbugs.

## 2021-05-18 LAB
ATRIAL RATE: 100 BPM
CALCULATED P AXIS, ECG09: 64 DEGREES
CALCULATED R AXIS, ECG10: -38 DEGREES
CALCULATED T AXIS, ECG11: 55 DEGREES
DIAGNOSIS, 93000: NORMAL
P-R INTERVAL, ECG05: 138 MS
Q-T INTERVAL, ECG07: 352 MS
QRS DURATION, ECG06: 82 MS
QTC CALCULATION (BEZET), ECG08: 454 MS
VENTRICULAR RATE, ECG03: 100 BPM

## 2021-12-07 ENCOUNTER — APPOINTMENT (OUTPATIENT)
Dept: GENERAL RADIOLOGY | Age: 66
End: 2021-12-07
Attending: STUDENT IN AN ORGANIZED HEALTH CARE EDUCATION/TRAINING PROGRAM
Payer: MEDICAID

## 2021-12-07 ENCOUNTER — HOSPITAL ENCOUNTER (EMERGENCY)
Age: 66
Discharge: HOME | End: 2021-12-07
Attending: STUDENT IN AN ORGANIZED HEALTH CARE EDUCATION/TRAINING PROGRAM
Payer: MEDICAID

## 2021-12-07 VITALS
TEMPERATURE: 98 F | HEIGHT: 67 IN | RESPIRATION RATE: 28 BRPM | SYSTOLIC BLOOD PRESSURE: 141 MMHG | DIASTOLIC BLOOD PRESSURE: 79 MMHG | HEART RATE: 98 BPM | WEIGHT: 140 LBS | OXYGEN SATURATION: 89 % | BODY MASS INDEX: 21.97 KG/M2

## 2021-12-07 DIAGNOSIS — E86.0 DEHYDRATION: ICD-10-CM

## 2021-12-07 DIAGNOSIS — Z87.09 HISTORY OF ASBESTOSIS: ICD-10-CM

## 2021-12-07 DIAGNOSIS — Z72.0 TOBACCO USE: ICD-10-CM

## 2021-12-07 DIAGNOSIS — J20.9 ACUTE BRONCHITIS WITH BRONCHOSPASM: Primary | ICD-10-CM

## 2021-12-07 DIAGNOSIS — Z20.822 PERSON UNDER INVESTIGATION FOR COVID-19: ICD-10-CM

## 2021-12-07 LAB
ALBUMIN SERPL-MCNC: 3.5 G/DL (ref 3.4–5)
ALBUMIN/GLOB SERPL: 1 {RATIO} (ref 0.8–1.7)
ALP SERPL-CCNC: 77 U/L (ref 45–117)
ALT SERPL-CCNC: 19 U/L (ref 13–56)
ANION GAP SERPL CALC-SCNC: 1 MMOL/L (ref 3–18)
APPEARANCE UR: CLEAR
AST SERPL-CCNC: 16 U/L (ref 10–38)
ATRIAL RATE: 92 BPM
B PERT DNA SPEC QL NAA+PROBE: NOT DETECTED
BASOPHILS # BLD: 0 K/UL (ref 0–0.1)
BASOPHILS NFR BLD: 0 % (ref 0–2)
BILIRUB SERPL-MCNC: 0.3 MG/DL (ref 0.2–1)
BILIRUB UR QL: NEGATIVE
BNP SERPL-MCNC: 133 PG/ML (ref 0–900)
BORDETELLA PARAPERTUSSIS PCR, BORPAR: NOT DETECTED
BUN SERPL-MCNC: 26 MG/DL (ref 7–18)
BUN/CREAT SERPL: 26 (ref 12–20)
C PNEUM DNA SPEC QL NAA+PROBE: NOT DETECTED
CALCIUM SERPL-MCNC: 8.5 MG/DL (ref 8.5–10.1)
CALCULATED P AXIS, ECG09: 31 DEGREES
CALCULATED R AXIS, ECG10: -69 DEGREES
CALCULATED T AXIS, ECG11: 53 DEGREES
CHLORIDE SERPL-SCNC: 98 MMOL/L (ref 100–111)
CK MB CFR SERPL CALC: 4.7 % (ref 0–4)
CK MB SERPL-MCNC: 7.9 NG/ML (ref 5–25)
CK SERPL-CCNC: 169 U/L (ref 26–192)
CO2 SERPL-SCNC: 33 MMOL/L (ref 21–32)
COLOR UR: YELLOW
CREAT SERPL-MCNC: 1 MG/DL (ref 0.6–1.3)
D DIMER PPP FEU-MCNC: 0.63 UG/ML(FEU)
DIAGNOSIS, 93000: NORMAL
DIFFERENTIAL METHOD BLD: ABNORMAL
EOSINOPHIL # BLD: 0.1 K/UL (ref 0–0.4)
EOSINOPHIL NFR BLD: 1 % (ref 0–5)
ERYTHROCYTE [DISTWIDTH] IN BLOOD BY AUTOMATED COUNT: 13.9 % (ref 11.6–14.5)
FLUAV H1 2009 PAND RNA SPEC QL NAA+PROBE: NOT DETECTED
FLUAV H1 RNA SPEC QL NAA+PROBE: NOT DETECTED
FLUAV H3 RNA SPEC QL NAA+PROBE: NOT DETECTED
FLUAV SUBTYP SPEC NAA+PROBE: NOT DETECTED
FLUBV RNA SPEC QL NAA+PROBE: NOT DETECTED
GLOBULIN SER CALC-MCNC: 3.4 G/DL (ref 2–4)
GLUCOSE SERPL-MCNC: 82 MG/DL (ref 74–99)
GLUCOSE UR STRIP.AUTO-MCNC: NEGATIVE MG/DL
HADV DNA SPEC QL NAA+PROBE: NOT DETECTED
HCOV 229E RNA SPEC QL NAA+PROBE: NOT DETECTED
HCOV HKU1 RNA SPEC QL NAA+PROBE: NOT DETECTED
HCOV NL63 RNA SPEC QL NAA+PROBE: NOT DETECTED
HCOV OC43 RNA SPEC QL NAA+PROBE: NOT DETECTED
HCT VFR BLD AUTO: 33 % (ref 35–45)
HGB BLD-MCNC: 10.9 G/DL (ref 12–16)
HGB UR QL STRIP: NEGATIVE
HMPV RNA SPEC QL NAA+PROBE: NOT DETECTED
HPIV1 RNA SPEC QL NAA+PROBE: NOT DETECTED
HPIV2 RNA SPEC QL NAA+PROBE: NOT DETECTED
HPIV3 RNA SPEC QL NAA+PROBE: NOT DETECTED
HPIV4 RNA SPEC QL NAA+PROBE: NOT DETECTED
IMM GRANULOCYTES # BLD AUTO: 0 K/UL (ref 0–0.04)
IMM GRANULOCYTES NFR BLD AUTO: 0 % (ref 0–0.5)
KETONES UR QL STRIP.AUTO: NEGATIVE MG/DL
LEUKOCYTE ESTERASE UR QL STRIP.AUTO: NEGATIVE
LYMPHOCYTES # BLD: 2.1 K/UL (ref 0.9–3.6)
LYMPHOCYTES NFR BLD: 22 % (ref 21–52)
M PNEUMO DNA SPEC QL NAA+PROBE: NOT DETECTED
MCH RBC QN AUTO: 31.1 PG (ref 24–34)
MCHC RBC AUTO-ENTMCNC: 33 G/DL (ref 31–37)
MCV RBC AUTO: 94 FL (ref 78–100)
MONOCYTES # BLD: 1.1 K/UL (ref 0.05–1.2)
MONOCYTES NFR BLD: 12 % (ref 3–10)
NEUTS SEG # BLD: 5.9 K/UL (ref 1.8–8)
NEUTS SEG NFR BLD: 64 % (ref 40–73)
NITRITE UR QL STRIP.AUTO: NEGATIVE
NRBC # BLD: 0 K/UL (ref 0–0.01)
NRBC BLD-RTO: 0 PER 100 WBC
P-R INTERVAL, ECG05: 146 MS
PH UR STRIP: 5.5 [PH] (ref 5–8)
PLATELET # BLD AUTO: 365 K/UL (ref 135–420)
PMV BLD AUTO: 8.3 FL (ref 9.2–11.8)
POTASSIUM SERPL-SCNC: 4.5 MMOL/L (ref 3.5–5.5)
PROT SERPL-MCNC: 6.9 G/DL (ref 6.4–8.2)
PROT UR STRIP-MCNC: NEGATIVE MG/DL
Q-T INTERVAL, ECG07: 354 MS
QRS DURATION, ECG06: 82 MS
QTC CALCULATION (BEZET), ECG08: 437 MS
RBC # BLD AUTO: 3.51 M/UL (ref 4.2–5.3)
RSV RNA SPEC QL NAA+PROBE: NOT DETECTED
RV+EV RNA SPEC QL NAA+PROBE: NOT DETECTED
SARS-COV-2 PCR, COVPCR: NOT DETECTED
SODIUM SERPL-SCNC: 132 MMOL/L (ref 136–145)
SP GR UR REFRACTOMETRY: 1.01 (ref 1–1.03)
TROPONIN I SERPL-MCNC: <0.02 NG/ML (ref 0–0.04)
UROBILINOGEN UR QL STRIP.AUTO: 0.2 EU/DL (ref 0.2–1)
VENTRICULAR RATE, ECG03: 92 BPM
WBC # BLD AUTO: 9.2 K/UL (ref 4.6–13.2)

## 2021-12-07 PROCEDURE — 85025 COMPLETE CBC W/AUTO DIFF WBC: CPT

## 2021-12-07 PROCEDURE — 0202U NFCT DS 22 TRGT SARS-COV-2: CPT

## 2021-12-07 PROCEDURE — 74011250636 HC RX REV CODE- 250/636: Performed by: PHYSICIAN ASSISTANT

## 2021-12-07 PROCEDURE — 94640 AIRWAY INHALATION TREATMENT: CPT

## 2021-12-07 PROCEDURE — 71045 X-RAY EXAM CHEST 1 VIEW: CPT

## 2021-12-07 PROCEDURE — 74011250637 HC RX REV CODE- 250/637: Performed by: PHYSICIAN ASSISTANT

## 2021-12-07 PROCEDURE — 80053 COMPREHEN METABOLIC PANEL: CPT

## 2021-12-07 PROCEDURE — 96361 HYDRATE IV INFUSION ADD-ON: CPT

## 2021-12-07 PROCEDURE — 93005 ELECTROCARDIOGRAM TRACING: CPT

## 2021-12-07 PROCEDURE — 83880 ASSAY OF NATRIURETIC PEPTIDE: CPT

## 2021-12-07 PROCEDURE — 81003 URINALYSIS AUTO W/O SCOPE: CPT

## 2021-12-07 PROCEDURE — 99284 EMERGENCY DEPT VISIT MOD MDM: CPT

## 2021-12-07 PROCEDURE — 96374 THER/PROPH/DIAG INJ IV PUSH: CPT

## 2021-12-07 PROCEDURE — 82553 CREATINE MB FRACTION: CPT

## 2021-12-07 PROCEDURE — 85379 FIBRIN DEGRADATION QUANT: CPT

## 2021-12-07 RX ORDER — BENZONATATE 100 MG/1
100 CAPSULE ORAL
Qty: 21 CAPSULE | Refills: 0 | Status: SHIPPED | OUTPATIENT
Start: 2021-12-07 | End: 2021-12-14

## 2021-12-07 RX ORDER — IPRATROPIUM BROMIDE AND ALBUTEROL SULFATE 2.5; .5 MG/3ML; MG/3ML
3 SOLUTION RESPIRATORY (INHALATION)
Status: DISCONTINUED | OUTPATIENT
Start: 2021-12-07 | End: 2021-12-07 | Stop reason: HOSPADM

## 2021-12-07 RX ORDER — ALBUTEROL SULFATE 90 UG/1
2 AEROSOL, METERED RESPIRATORY (INHALATION)
Status: COMPLETED | OUTPATIENT
Start: 2021-12-07 | End: 2021-12-07

## 2021-12-07 RX ORDER — DEXAMETHASONE SODIUM PHOSPHATE 4 MG/ML
10 INJECTION, SOLUTION INTRA-ARTICULAR; INTRALESIONAL; INTRAMUSCULAR; INTRAVENOUS; SOFT TISSUE
Status: COMPLETED | OUTPATIENT
Start: 2021-12-07 | End: 2021-12-07

## 2021-12-07 RX ORDER — PREDNISONE 10 MG/1
TABLET ORAL
Qty: 43 TABLET | Refills: 0 | Status: SHIPPED | OUTPATIENT
Start: 2021-12-07

## 2021-12-07 RX ORDER — DOXYCYCLINE HYCLATE 100 MG
100 TABLET ORAL 2 TIMES DAILY
Qty: 20 TABLET | Refills: 0 | Status: SHIPPED | OUTPATIENT
Start: 2021-12-07 | End: 2021-12-17

## 2021-12-07 RX ADMIN — SODIUM CHLORIDE 1000 ML: 9 INJECTION, SOLUTION INTRAVENOUS at 11:27

## 2021-12-07 RX ADMIN — ALBUTEROL SULFATE 2 PUFF: 108 AEROSOL, METERED RESPIRATORY (INHALATION) at 11:35

## 2021-12-07 RX ADMIN — DEXAMETHASONE SODIUM PHOSPHATE 10 MG: 4 INJECTION, SOLUTION INTRAMUSCULAR; INTRAVENOUS at 11:26

## 2021-12-07 NOTE — ED NOTES
Pt soaked with urine in pull up pad;  Pt asked if she knew she had to go to bathroom and stated to this nurse \"I fell asleep and I soaked through my diaper, I dont care\"   Asked patient six times to changes her clothes, offered gowns to go home in and paper scrubs;   Pt refused every time and insists she will put a regular diaper on and wear those pants home;  Pt refused to let me take her shoes off or her pants to change pad;  Pt requested a diaper be placed with her standing up and refused to allow me to change her clothes or given her dry clothes to change into;

## 2021-12-07 NOTE — ED PROVIDER NOTES
EMERGENCY DEPARTMENT HISTORY AND PHYSICAL EXAM    Date: 12/7/2021  Patient Name: Meghann Alba    History of Presenting Illness     Chief Complaint   Patient presents with    Shortness of Breath         History Provided By: Patient    Chief Complaint: SOB, cough    HPI(Context):   10:15 AM  Meghann Alba is a 61 y.o. female with PMHX of asbestosis, asthma, COPD, chronic pain who presents to the emergency department C/O SOB. Associated sxs include cough, congestion, and wheezing. Pt notes drainage from right eye and with irritation. Pt feels she has an infection. No COVID exposures. Pt is vaccinated for COVID-19. Pt is not on home oxygen. Pt notes her oxygen is always low in low 90s. Pt does not have albuterol at home but has used HFA in past. Pt is active smoker. Pt denies fever, chills, CP, hx of DVT/PE, leg swelling, myalgias, and any other sxs or complaints. PCP: Tony Ross MD    Current Outpatient Medications   Medication Sig Dispense Refill    predniSONE (DELTASONE) 10 mg tablet Take 6 pills for 2 days, 5 pills for 2 days, 4 pills for 2 days, 3 pills for 2 days, 2 pills for 2 days, 1 pill for 2 days, 1/2 pill for 2 days. Take with food. 43 Tablet 0    benzonatate (Tessalon Perles) 100 mg capsule Take 1 Capsule by mouth three (3) times daily as needed for Cough for up to 7 days. 21 Capsule 0    doxycycline (VIBRA-TABS) 100 mg tablet Take 1 Tablet by mouth two (2) times a day for 10 days. 20 Tablet 0    diclofenac (VOLTAREN) 1 % gel Apply 2 g to affected area four (4) times daily. 100 g 0    albuterol (PROVENTIL HFA, VENTOLIN HFA, PROAIR HFA) 90 mcg/actuation inhaler Take 1 Puff by inhalation every four (4) hours as needed for Wheezing. 1 Inhaler 0    acetaminophen-codeine (TYLENOL-CODEINE #3) 300-30 mg per tablet Take 1 Tab by mouth every four (4) hours as needed for Pain. Max Daily Amount: 6 Tabs.  10 Tab 0    albuterol (PROVENTIL HFA, VENTOLIN HFA, PROAIR HFA) 90 mcg/actuation inhaler Take 2 Puffs by inhalation every four (4) hours as needed for Wheezing. 1 Inhaler 0    fluPHENZAine decanoate (PROLIXIN) 25 mg/mL injection by IntraMUSCular route. Indications: every month         Past History     Past Medical History:  Past Medical History:   Diagnosis Date    Asbestosis(501)     Asthma     Back pain     Cancer ([facility] [address]     growth in stomach    Chronic obstructive pulmonary disease (HCC)     Chronic pain     Dystonia     Fall     MI (myocardial infarction) ([facility] [address]     Osteoporosis     Psychiatric disorder     \"nerves\"        Past Surgical History:  Past Surgical History:   Procedure Laterality Date    HX GYN      C section, hysterectomy       Family History:  History reviewed. No pertinent family history. Social History:  Social History     Tobacco Use    Smoking status: Current Every Day Smoker     Packs/day: 0.25    Smokeless tobacco: Never Used   Substance Use Topics    Alcohol use: Yes     Comment: rare    Drug use: No       Allergies: Allergies   Allergen Reactions    Aspirin Other (comments)     Spasms      Ceclor [Cefaclor] Rash     Constipated headache    Ciprofloxacin Rash and Nausea Only    Cogentin [Benztropine] Other (comments)     \"Spasms\"    Ibuprofen Other (comments)     Spasms      Sulfa (Sulfonamide Antibiotics) Rash     Constipation, headache    Thorazine [Chlorpromazine] Other (comments)     \" I hear voices\"          Review of Systems   Review of Systems   Constitutional: Negative for chills and fever. HENT: Positive for congestion. Negative for sore throat. Respiratory: Positive for cough, shortness of breath and wheezing. Gastrointestinal: Negative for vomiting. Musculoskeletal: Negative for myalgias. Allergic/Immunologic: Negative for immunocompromised state. All other systems reviewed and are negative.       Physical Exam     Vitals:    12/07/21 1011 12/07/21 1135 12/07/21 1337 12/07/21 1416   BP: (!) 150/74 (!) 150/74 (!) 156/91 (!) 141/79 Pulse: 94 95 (!) 105 98   Resp: 16  28 28   Temp: 97.9 °F (36.6 °C)  98 °F (36.7 °C)    SpO2: 93%  90% (!) 89%   Weight: 63.5 kg (140 lb)      Height: 5' 7\" (1.702 m)        Physical Exam  Vitals and nursing note reviewed. Constitutional:       General: She is not in acute distress. Appearance: She is well-developed. She is not diaphoretic. Comments:  female in NAD. Mild tachypnea. HENT:      Head: Normocephalic and atraumatic. Right Ear: External ear normal.      Left Ear: External ear normal.      Nose: Congestion present. No mucosal edema or rhinorrhea. Eyes:      General: No scleral icterus. Right eye: No discharge. Left eye: No discharge. Conjunctiva/sclera: Conjunctivae normal.   Cardiovascular:      Rate and Rhythm: Normal rate and regular rhythm. Heart sounds: Normal heart sounds. No murmur heard. No friction rub. No gallop. Pulmonary:      Effort: Tachypnea and accessory muscle usage present. No respiratory distress. Breath sounds: Examination of the right-lower field reveals wheezing. Examination of the left-lower field reveals wheezing. Wheezing present. No decreased breath sounds, rhonchi or rales. Musculoskeletal:         General: Normal range of motion. Cervical back: Normal range of motion. Right lower leg: No edema. Left lower leg: No edema. Skin:     General: Skin is warm and dry. Neurological:      Mental Status: She is alert and oriented to person, place, and time. Psychiatric:         Mood and Affect: Mood is anxious.          Judgment: Judgment normal.             Diagnostic Study Results     Labs -     Recent Results (from the past 12 hour(s))   EKG, 12 LEAD, INITIAL    Collection Time: 12/07/21 10:30 AM   Result Value Ref Range    Ventricular Rate 92 BPM    Atrial Rate 92 BPM    P-R Interval 146 ms    QRS Duration 82 ms    Q-T Interval 354 ms    QTC Calculation (Bezet) 437 ms    Calculated P Axis 31 degrees Calculated R Axis -69 degrees    Calculated T Axis 53 degrees    Diagnosis       Normal sinus rhythm  Left axis deviation  Septal infarct , age undetermined  Abnormal ECG  When compared with ECG of 17-MAY-2021 06:28,  premature atrial complexes are no longer present  Septal infarct is now present     URINALYSIS W/ RFLX MICROSCOPIC    Collection Time: 12/07/21 10:42 AM   Result Value Ref Range    Color YELLOW      Appearance CLEAR      Specific gravity 1.006 1.005 - 1.030      pH (UA) 5.5 5.0 - 8.0      Protein Negative NEG mg/dL    Glucose Negative NEG mg/dL    Ketone Negative NEG mg/dL    Bilirubin Negative NEG      Blood Negative NEG      Urobilinogen 0.2 0.2 - 1.0 EU/dL    Nitrites Negative NEG      Leukocyte Esterase Negative NEG     CBC WITH AUTOMATED DIFF    Collection Time: 12/07/21 11:18 AM   Result Value Ref Range    WBC 9.2 4.6 - 13.2 K/uL    RBC 3.51 (L) 4.20 - 5.30 M/uL    HGB 10.9 (L) 12.0 - 16.0 g/dL    HCT 33.0 (L) 35.0 - 45.0 %    MCV 94.0 78.0 - 100.0 FL    MCH 31.1 24.0 - 34.0 PG    MCHC 33.0 31.0 - 37.0 g/dL    RDW 13.9 11.6 - 14.5 %    PLATELET 531 816 - 401 K/uL    MPV 8.3 (L) 9.2 - 11.8 FL    NRBC 0.0 0  WBC    ABSOLUTE NRBC 0.00 0.00 - 0.01 K/uL    NEUTROPHILS 64 40 - 73 %    LYMPHOCYTES 22 21 - 52 %    MONOCYTES 12 (H) 3 - 10 %    EOSINOPHILS 1 0 - 5 %    BASOPHILS 0 0 - 2 %    IMMATURE GRANULOCYTES 0 0.0 - 0.5 %    ABS. NEUTROPHILS 5.9 1.8 - 8.0 K/UL    ABS. LYMPHOCYTES 2.1 0.9 - 3.6 K/UL    ABS. MONOCYTES 1.1 0.05 - 1.2 K/UL    ABS. EOSINOPHILS 0.1 0.0 - 0.4 K/UL    ABS. BASOPHILS 0.0 0.0 - 0.1 K/UL    ABS. IMM.  GRANS. 0.0 0.00 - 0.04 K/UL    DF AUTOMATED     METABOLIC PANEL, COMPREHENSIVE    Collection Time: 12/07/21 11:18 AM   Result Value Ref Range    Sodium 132 (L) 136 - 145 mmol/L    Potassium 4.5 3.5 - 5.5 mmol/L    Chloride 98 (L) 100 - 111 mmol/L    CO2 33 (H) 21 - 32 mmol/L    Anion gap 1 (L) 3.0 - 18 mmol/L    Glucose 82 74 - 99 mg/dL    BUN 26 (H) 7.0 - 18 MG/DL Creatinine 1.00 0.6 - 1.3 MG/DL    BUN/Creatinine ratio 26 (H) 12 - 20      GFR est AA >60 >60 ml/min/1.73m2    GFR est non-AA 56 (L) >60 ml/min/1.73m2    Calcium 8.5 8.5 - 10.1 MG/DL    Bilirubin, total 0.3 0.2 - 1.0 MG/DL    ALT (SGPT) 19 13 - 56 U/L    AST (SGOT) 16 10 - 38 U/L    Alk.  phosphatase 77 45 - 117 U/L    Protein, total 6.9 6.4 - 8.2 g/dL    Albumin 3.5 3.4 - 5.0 g/dL    Globulin 3.4 2.0 - 4.0 g/dL    A-G Ratio 1.0 0.8 - 1.7     CARDIAC PANEL,(CK, CKMB & TROPONIN)    Collection Time: 12/07/21 11:18 AM   Result Value Ref Range    CK - MB 7.9 (H) <3.6 ng/ml    CK-MB Index 4.7 (H) 0.0 - 4.0 %     26 - 192 U/L    Troponin-I, QT <0.02 0.0 - 0.045 NG/ML   NT-PRO BNP    Collection Time: 12/07/21 11:18 AM   Result Value Ref Range    NT pro- 0 - 900 PG/ML   RESPIRATORY VIRUS PANEL W/COVID-19, PCR    Collection Time: 12/07/21 11:18 AM    Specimen: Nasopharyngeal   Result Value Ref Range    Adenovirus Not detected NOTD      Coronavirus 229E Not detected NOTD      Coronavirus HKU1 Not detected NOTD      Coronavirus CVNL63 Not detected NOTD      Coronavirus OC43 Not detected NOTD      SARS-CoV-2, PCR Not detected NOTD      Metapneumovirus Not detected NOTD      Rhinovirus and Enterovirus Not detected NOTD      Influenza A Not detected NOTD      Influenza A, subtype H1 Not detected NOTD      Influenza A, subtype H3 Not detected NOTD      INFLUENZA A H1N1 PCR Not detected NOTD      Influenza B Not detected NOTD      Parainfluenza 1 Not detected NOTD      Parainfluenza 2 Not detected NOTD      Parainfluenza 3 Not detected NOTD      Parainfluenza virus 4 Not detected NOTD      RSV by PCR Not detected NOTD      B. parapertussis, PCR Not detected NOTD      Bordetella pertussis - PCR Not detected NOTD      Chlamydophila pneumoniae DNA, QL, PCR Not detected NOTD      Mycoplasma pneumoniae DNA, QL, PCR Not detected NOTD     D DIMER    Collection Time: 12/07/21 11:18 AM   Result Value Ref Range    D DIMER 0.63 (H) <0.46 ug/ml(FEU)           XR CHEST PORT   Final Result      Rotated projection of the chest without superimposed acute radiographic   abnormality. CT Results  (Last 48 hours)    None        CXR Results  (Last 48 hours)               12/07/21 1045  XR CHEST PORT Final result    Impression:      Rotated projection of the chest without superimposed acute radiographic   abnormality. Narrative:  EXAM: XR CHEST PORT       CLINICAL INDICATION/HISTORY: shortness of breath   -Additional: Cough and wheezing       COMPARISON: May 17, 2021       TECHNIQUE: Frontal view of the chest       _______________       FINDINGS:       HEART AND MEDIASTINUM: Normal cardiac size and mediastinal contours. LUNGS AND PLEURAL SPACES: Rotated nature the projection foreshortening the left   hemithorax. No focal pneumonic opacity. No evidence of pneumothorax or pleural   effusion. BONY THORAX AND SOFT TISSUES: No acute osseous abnormality       _______________                 Medications given in the ED-  Medications   albuterol (PROVENTIL HFA, VENTOLIN HFA, PROAIR HFA) inhaler 2 Puff (2 Puffs Inhalation Given 12/7/21 1135)   dexamethasone (DECADRON) 4 mg/mL injection 10 mg (10 mg IntraVENous Given 12/7/21 1126)   sodium chloride 0.9 % bolus infusion 1,000 mL (0 mL IntraVENous IV Completed 12/7/21 1300)         Medical Decision Making   I am the first provider for this patient. I reviewed the vital signs, available nursing notes, past medical history, past surgical history, family history and social history. Vital Signs-Reviewed the patient's vital signs. Pulse Oximetry Analysis - 93% on RA. Low normal     Records Reviewed: Nursing Notes, Old Medical Records, Previous electrocardiograms, Previous Radiology Studies and Previous Laboratory Studies    Provider Notes (Medical Decision Making): COPD, CHF, asthma, PNA, PTX, PE, COVID.  Doubt MI    Procedures:  Procedures    ED Course:   10:15 AM Initial assessment performed. The patients presenting problems have been discussed, and they are in agreement with the care plan formulated and outlined with them. I have encouraged them to ask questions as they arise throughout their visit. Diagnosis and Disposition       Pt feels better after tx. Labs reveal dehydration with IVF given in ED. Rest baseline. Age adjusted DDimer negative. Suspect flare up of COPD/asbestosis. Pt notes her oxygen is always low. Offered duo nebs but pt refuses and is requesting discharge. Requests prednisone and pt was given albuterol HFA with spacer. ABX. Antitussive. FU with [facility] pulmonology. Reasons to RTED discussed with pt. All questions answered. Pt feels comfortable going home at this time. Pt expressed understanding and she agrees with plan. 1. Acute bronchitis with bronchospasm    2. History of asbestosis    3. Person under investigation for COVID-19    4. Dehydration    5. Tobacco use        PLAN:  1. D/C Home  2. Discharge Medication List as of 12/7/2021  1:56 PM      START taking these medications    Details   predniSONE (DELTASONE) 10 mg tablet Take 6 pills for 2 days, 5 pills for 2 days, 4 pills for 2 days, 3 pills for 2 days, 2 pills for 2 days, 1 pill for 2 days, 1/2 pill for 2 days. Take with food., Normal, Disp-43 Tablet, R-0      benzonatate (Tessalon Perles) 100 mg capsule Take 1 Capsule by mouth three (3) times daily as needed for Cough for up to 7 days. , Normal, Disp-21 Capsule, R-0      doxycycline (VIBRA-TABS) 100 mg tablet Take 1 Tablet by mouth two (2) times a day for 10 days. , Normal, Disp-20 Tablet, R-0         CONTINUE these medications which have NOT CHANGED    Details   diclofenac (VOLTAREN) 1 % gel Apply 2 g to affected area four (4) times daily. , Normal, Disp-100 g, R-0      !! albuterol (PROVENTIL HFA, VENTOLIN HFA, PROAIR HFA) 90 mcg/actuation inhaler Take 1 Puff by inhalation every four (4) hours as needed for Wheezing., Normal, Disp-1 Inhaler, R-0      acetaminophen-codeine (TYLENOL-CODEINE #3) 300-30 mg per tablet Take 1 Tab by mouth every four (4) hours as needed for Pain. Max Daily Amount: 6 Tabs., Print, Disp-10 Tab, R-0      !! albuterol (PROVENTIL HFA, VENTOLIN HFA, PROAIR HFA) 90 mcg/actuation inhaler Take 2 Puffs by inhalation every four (4) hours as needed for Wheezing., Print, Disp-1 Inhaler, R-0      fluPHENZAine decanoate (PROLIXIN) 25 mg/mL injection by IntraMUSCular route. Indications: every month, Historical Med       !! - Potential duplicate medications found. Please discuss with provider. 3.   Follow-up Information     Follow up With Specialties Details Why Contact Info    Jacob Adamson MD Pulmonary Disease  follow up with [facility], [facility]. pulmonology [address]  Jacques Nagel [address] [place] [address] Internal Medicine   [address] [place] [address]      [facility] EMERGENCY DEPT Emergency Medicine   [address] [place] [address]  [phone]        _______________________________    Attestations: This note is prepared by Nanette De Leon PA-C.  _______________________________          Please note that this dictation was completed with [employer], the computer voice recognition software. Quite often unanticipated grammatical, syntax, homophones, and other interpretive errors are inadvertently transcribed by the computer software. Please disregard these errors. Please excuse any errors that have escaped final proofreading.